# Patient Record
Sex: FEMALE | Race: WHITE | Employment: FULL TIME | URBAN - METROPOLITAN AREA
[De-identification: names, ages, dates, MRNs, and addresses within clinical notes are randomized per-mention and may not be internally consistent; named-entity substitution may affect disease eponyms.]

---

## 2021-03-16 PROBLEM — E66.01 MORBID OBESITY (HCC): Status: ACTIVE | Noted: 2021-03-16

## 2021-03-16 PROBLEM — F32.A DEPRESSION: Status: ACTIVE | Noted: 2021-03-16

## 2021-03-16 PROBLEM — F41.9 ANXIETY: Status: ACTIVE | Noted: 2021-03-16

## 2021-03-16 PROBLEM — I10 HTN (HYPERTENSION): Status: ACTIVE | Noted: 2021-03-16

## 2021-03-16 NOTE — H&P (VIEW-ONLY)
Faizan Couch MD  
Bariatric & Advanced Laparoscopic Surgery & Endoscopy [address], [address] [place] Kim Office [phone] Fax [phone] Date of visit: 3/30/2021 History and Physical 
 
Patient: Elysia Cox MRN: [id number] YOB: 1975  Age: 41 y.o. Sex: female PCP: Gale White MD, in  [facility] Date:  3/30/2021 Elysia Cox  who presents to the [facility] for consideration of bariatric surgery. This is her initial consultation preparing her for our multi-disciplinary surgical preparatory regimen. She presents with a height of 5' 2.5\" (1.588 m) and weight of (!) 459 lb (208.2 kg), giving her a Body mass index is 82.61 kg/m². She has an Ideal body weight of 136 lbs, and excess body weight of 323 lbs. BARIATRIC PROCEDURE OF INTEREST: laparoscopic sleeve surgery 30 day Bariatric Surgical Risk Percentage: 4.50% Sleeve, 9.24% MEDICAL HISTORY: 
Morbid Obesity Depression Anxiety PCOS 
  
Comorbidity Yes or No  
Obstructive Sleep Apnea CPAP/BIPAP use= No  
Diabetes Mellitus Insulin dependent = Last A1c= No  
Hypertension- 
Number of medications=1 Yes Gastroesophageal Reflux Treatment Med= No  
Hyperlipidemia with medication No  
Coronary Artery Disease No  
Cancer No  
Asthma No  
Osteoarthritis No  
Joint Pain No  
  
Patient reports taking Tums after eating certain foods due to acid reflux. Pt reports a burning sensation in her throat being the reason she needs to take medication. She may take a TUMS every 3 months for symptoms.  Pt does report having reflux and on medication during pregnancy.  
  
Other Yes or No  
Venous Stasis No  
Dialysis No  
Long term use of steroids or immunocompromised conditions No  
On Anticoagulants No  
  
PRIOR WEIGHT LOSS ATTEMPTS:  >15 attempts including Weight Watchers, Medication, low fat, Keto 
  
EXERCISE ASSESSMENT:  Walking 2x week, NIH Guidelines reviewed.  
  
DENTAL ISSUES:  No dental issues with chewing  
  
PSYCHOSOCIAL: 
She notes she  is  and states main support people are Her spouse and mother. 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 Her goal in pursuing surgical weight loss is to improve health. She  reports appropriate treatment of depression and anxiety. She states she is independent in her care, can drive a car, and can ambulate without assistance. HISTORY: 
Past Medical History:  
Diagnosis Date  Depression  Hypertension  Morbid obesity ([address] She denies personal or family history of problems with anesthesia, bleeding, or clotting. She denies history of DVT or pulmonary embolism. She denies reflux or dysphagia. Past Surgical History:  
Procedure Laterality Date  HX ADENOIDECTOMY    HX  SECTION  ,   HX CHOLECYSTECTOMY    HX TONSILLECTOMY   Social History Tobacco Use  Smoking status: Former Smoker Quit date:  Years since quittin.2  Smokeless tobacco: Never Used  Tobacco comment: Social smoker Substance Use Topics  Alcohol use: Not Currently  Drug use: Never Family History Problem Relation Age of Onset  No Known Problems Mother  Diabetes Father  Hypertension Father  COPD Father MEDICATIONS: 
Current Outpatient Medications Medication Sig  
 lisinopriL (PRINIVIL, ZESTRIL) 10 mg tablet Take 10 mg by mouth daily.  buPROPion XL (Wellbutrin XL) 150 mg tablet Take 150 mg by mouth every morning.  traZODone (DESYREL) 100 mg tablet Take 100 mg by mouth two (2) times a day. No current facility-administered medications for this visit. Facility-Administered Medications Ordered in Other Visits Medication  barium sulfate (EZ PAQUE) 60 % (w/v) contrast susp 355 mL  barium Sulfate (E-Z-HD) 98 % contrast susp 135 mL ALLERGIES: 
Allergies Allergen Reactions  Tape [Adhesive] Other (comments) Paper tape- Causes Blisters ROS: The patient has no difficulty with chest pain or shortness of breath. No fever or chills. Comprehensive 13 point review of systems was otherwise unremarkable except as noted above. Physical Exam:  
 
Visit Vitals BP (!) 175/115 Pulse 92 Ht 5' 2.5\" (1.588 m) Wt (!) 459 lb (208.2 kg) BMI 82.61 kg/m² General:  Well-developed, well-nourished, no distress. Psych:  Cooperative, good insight and judgement. Neuro:  Alert, oriented to person, place and time. HEENT:  Normocephalic, atraumatic. Sclera clear. Lungs:  Unlabored breathing. Symmetrical chest expansion. Chest wall:  No tenderness or deformity. Heart:  Regular rate and rhythm. No JVD. Abdomen:  Soft, non-tender, non-distended. No guarding or rebound. No palpable masses. Extremities:  Extremities normal, atraumatic, no cyanosis or edema. Skin:  Skin color, texture, turgor normal. No rashes. ASSESSMENT: 
Morbid obesity with a Body mass index is 82.61 kg/m². beginning multi-disciplinary surgical prep program. 
 
PLAN: 
We have discussed the patient's participation and reviewed the steps in our preparatory program. She has had a long history of failed diet and exercise programs and has good understanding about the risks, benefits, and commitment related to bariatric surgery. She has attended our comprehensive educational seminar. She is interested in proceeding with our program seeking the laparoscopic sleeve gastrectomy.  We discussed specific risks of sleeve gastrectomy including but not limited to: pain, infection, bleeding, scar, excess skin, conversion to open approach, hernia, injury to adjacent organs, need for blood transfusion, thromboembolic complications, gastrointestinal leak, stricture, difficulty swallowing, need for revisional surgery, gastroesophageal reflux, esophagitis or esophageal cancer, need for revisional surgery, failure to lose weight or weight regain, nutritional deficiencies, heart attack, stroke, death. ICD-10-CM ICD-9-CM 1. H/O gastroesophageal reflux (GERD)  Z87.19 V12.79 XR UPPER GI SERIES W KUB 2. Encounter for pre-operative laboratory testing  Z01.812 V72.84 HEMOGLOBIN A1C WITH EAG  
   NICOTINE AND METABOLITE, QT SERUM, PLASMA, WHOLE BLOOD  
   TSH 3RD GENERATION  
   VITAMIN D, 25 HYDROXY 3. Morbid obesity ([facility]  E66.01 278.01 REFERRAL TO PHYSICAL THERAPY 4. Hypertension, unspecified type  I10 401.9 REFERRAL TO CARDIOLOGY 5. BMI 70 and over, adult ([facility]  Z68.45 V85.45 REFERRAL TO CARDIOLOGY REFERRAL TO PULMONARY DISEASE 6. Morbid obesity due to excess calories ([facility])  E66.01 278.01   
7. PCOS (polycystic ovarian syndrome)  E28.2 256.4 1. Discussed in detail the Laparoscopic Kit-en-Y Gastric Bypass and the Laparoscopic Sleeve Gastrectomy including the benefits, risks, and complications of each operation. The patient has a clear understanding of all operations. Also, discussed perioperative care and in-hospital and home care after each operation. The patient verbalized and demonstrated a clear understanding of what to expect. 2.  Patient is an excellent candidate with a clear medical necessity for bariatric surgery. 3.  Encouraged patient to participate in our Bariatric Support Group. 4.  Advised that weight loss surgery is only one part of a lifelong weight management program, and that sustainable weight loss will only come with major diet and behavioral changes. Advised that weight loss surgery requires a commitment to self-management and long-term follow up. 5.  Nutrition Recommendations: · Diet Rx  Low-moderate calorie intake (~5997-4675 kcal/day, based on 14-16kcal/kg ABW). Work on eating at least 3x/d with lean protein focus.  2 week pre-operative diet with caloric restriction of ~1200kcal/d.  
 
6.  Exercise Recommendations: Exercise routine, incorporating both cardio and strength training, building up to 5x/wk for at least 30 minutes. Physical therapy evaluation for guidance on exercise routine. 7.  The assessment and plan of care were reviewed in detail with the patient and her questions were answered. She will complete the following steps: 1. Complete bariatric labs after dietitian evaluation. 2.  Participation in our behavior modification program, reduced calorie diet program, and participation in our exercise program recommendations supervised by our office. 3.  Complete our required psychological evaluation. 4.  Reminded of policy of no weight gain during prep period. 5.  Upper GI ordered 6. Physical Therapy Evaluation 7. Cardiac Clearance: >70 BMI, HTN  
8. Pulmonary Clearance: >70 BMI She will return after the above are complete for assessment of her progress and schedule surgery. Time: I spent 60 minutes preparing to see patient (including chart review and preparation), obtaining and/or reviewing additional medical history, performing a physical exam and evaluation, documenting clinical information in the electronic health record, independently interpreting results, communicating results to patient, family or caregiver, and/or coordinating care. Signed: Keerthi Gutierrez MD 
Bariatric & Minimally Invasive Surgery 3/30/2021

## 2021-03-30 ENCOUNTER — HOSPITAL ENCOUNTER (OUTPATIENT)
Dept: GENERAL RADIOLOGY | Age: 46
Discharge: HOME | End: 2021-03-30
Attending: NURSE PRACTITIONER
Payer: COMMERCIAL

## 2021-03-30 DIAGNOSIS — Z87.19 H/O GASTROESOPHAGEAL REFLUX (GERD): ICD-10-CM

## 2021-03-30 PROCEDURE — 74011000250 HC RX REV CODE- 250: Performed by: NURSE PRACTITIONER

## 2021-03-30 PROCEDURE — 74246 X-RAY XM UPR GI TRC 2CNTRST: CPT

## 2021-03-30 RX ADMIN — ANTACID/ANTIFLATULENT 4 G: 380; 550; 10; 10 GRANULE, EFFERVESCENT ORAL at 11:45

## 2021-03-30 RX ADMIN — BARIUM SULFATE 355 ML: 0.6 SUSPENSION ORAL at 11:50

## 2021-03-30 RX ADMIN — BARIUM SULFATE 135 ML: 980 POWDER, FOR SUSPENSION ORAL at 11:47

## 2021-04-19 ENCOUNTER — HOSPITAL ENCOUNTER (OUTPATIENT)
Dept: SURGERY | Age: 46
Discharge: HOME | End: 2021-04-19

## 2021-04-20 VITALS — WEIGHT: 293 LBS | HEIGHT: 63 IN | BODY MASS INDEX: 51.91 KG/M2

## 2021-04-20 NOTE — PERIOP NOTES
Patient verified name, , and procedure. Type: 1a; abbreviated assessment per anesthesia guidelines  Labs per surgeon: No orders received. Labs per anesthesia: None. A negative Covid swab result is required to proceed with surgery; appointments are made by the surgeon office and test should be collected 7 days prior to surgery. The testing center is located at the . [address], [place]. Pt states she is getting COVID swab completed today and will bring results with her on the DOS. Instructed pt that they will be notified by the doctor office for time of arrival to GI lab. If any questions please call the GI lab at [phone]. Follow diet and prep instructions per office. May have clear liquids until 4 hours prior to time of arrival.    [place] or shower the night before and the am of surgery with antibacterial soap. No lotions, oils, powders, cologne on skin. No make up, eye make up or jewelry. Wear loose fitting comfortable, clean clothing. Must have adult present in building the entire time . Medications for the day of procedure Wellbutrin and Trazodone. The following discharge instructions reviewed with patient: medication given during procedure may cause drowsiness for several hours, therefore, do not drive or operate machinery for remainder of the day, no alcohol on the day of your procedure, resume regular diet and activity unless otherwise directed, for mild sore throat you may use Cepacol throat lozenges or warm salt water gargles as needed, call your physician for any problems or questions. Patient verbalizes understanding.

## 2021-04-21 ENCOUNTER — HOSPITAL ENCOUNTER (OUTPATIENT)
Dept: GENERAL RADIOLOGY | Age: 46
Discharge: HOME | End: 2021-04-21
Payer: COMMERCIAL

## 2021-04-21 DIAGNOSIS — Z01.811 PRE-OP CHEST EXAM: ICD-10-CM

## 2021-04-21 PROBLEM — R06.09 DOE (DYSPNEA ON EXERTION): Status: ACTIVE | Noted: 2021-04-21

## 2021-04-21 PROCEDURE — 71046 X-RAY EXAM CHEST 2 VIEWS: CPT

## 2021-04-25 ENCOUNTER — ANESTHESIA EVENT (OUTPATIENT)
Dept: ENDOSCOPY | Age: 46
End: 2021-04-25
Payer: COMMERCIAL

## 2021-04-25 RX ORDER — OXYCODONE HYDROCHLORIDE 5 MG/1
10 TABLET ORAL
Status: CANCELLED | OUTPATIENT
Start: 2021-04-25 | End: 2021-04-26

## 2021-04-25 RX ORDER — SODIUM CHLORIDE, SODIUM LACTATE, POTASSIUM CHLORIDE, CALCIUM CHLORIDE 600; 310; 30; 20 MG/100ML; MG/100ML; MG/100ML; MG/100ML
100 INJECTION, SOLUTION INTRAVENOUS CONTINUOUS
Status: CANCELLED | OUTPATIENT
Start: 2021-04-25

## 2021-04-25 RX ORDER — NALOXONE HYDROCHLORIDE 0.4 MG/ML
0.1 INJECTION, SOLUTION INTRAMUSCULAR; INTRAVENOUS; SUBCUTANEOUS AS NEEDED
Status: CANCELLED | OUTPATIENT
Start: 2021-04-25

## 2021-04-25 RX ORDER — ONDANSETRON 2 MG/ML
4 INJECTION INTRAMUSCULAR; INTRAVENOUS ONCE
Status: CANCELLED | OUTPATIENT
Start: 2021-04-25 | End: 2021-04-25

## 2021-04-25 RX ORDER — ALBUTEROL SULFATE 0.83 MG/ML
2.5 SOLUTION RESPIRATORY (INHALATION) AS NEEDED
Status: CANCELLED | OUTPATIENT
Start: 2021-04-25

## 2021-04-25 RX ORDER — HYDROMORPHONE HYDROCHLORIDE 2 MG/ML
0.5 INJECTION, SOLUTION INTRAMUSCULAR; INTRAVENOUS; SUBCUTANEOUS
Status: CANCELLED | OUTPATIENT
Start: 2021-04-25

## 2021-04-25 RX ORDER — OXYCODONE HYDROCHLORIDE 5 MG/1
5 TABLET ORAL
Status: CANCELLED | OUTPATIENT
Start: 2021-04-25 | End: 2021-04-26

## 2021-04-25 RX ORDER — DIPHENHYDRAMINE HYDROCHLORIDE 50 MG/ML
12.5 INJECTION, SOLUTION INTRAMUSCULAR; INTRAVENOUS
Status: CANCELLED | OUTPATIENT
Start: 2021-04-25 | End: 2021-04-26

## 2021-04-26 ENCOUNTER — ANESTHESIA (OUTPATIENT)
Dept: ENDOSCOPY | Age: 46
End: 2021-04-26
Payer: COMMERCIAL

## 2021-04-26 ENCOUNTER — HOSPITAL ENCOUNTER (OUTPATIENT)
Age: 46
Setting detail: OUTPATIENT SURGERY
Discharge: HOME | End: 2021-04-26
Attending: SURGERY | Admitting: SURGERY
Payer: COMMERCIAL

## 2021-04-26 VITALS
TEMPERATURE: 97.8 F | OXYGEN SATURATION: 97 % | HEIGHT: 62 IN | WEIGHT: 293 LBS | BODY MASS INDEX: 53.92 KG/M2 | DIASTOLIC BLOOD PRESSURE: 65 MMHG | SYSTOLIC BLOOD PRESSURE: 143 MMHG | HEART RATE: 77 BPM | RESPIRATION RATE: 18 BRPM

## 2021-04-26 DIAGNOSIS — K21.9 GASTROESOPHAGEAL REFLUX DISEASE, UNSPECIFIED WHETHER ESOPHAGITIS PRESENT: ICD-10-CM

## 2021-04-26 DIAGNOSIS — E66.01 MORBID OBESITY (HCC): ICD-10-CM

## 2021-04-26 LAB
25(OH)D3 SERPL-MCNC: 28.8 NG/ML (ref 30–100)
EST. AVERAGE GLUCOSE BLD GHB EST-MCNC: 100 MG/DL
HBA1C MFR BLD: 5.1 % (ref 4.2–6.3)
TSH SERPL DL<=0.005 MIU/L-ACNC: 0.56 UIU/ML

## 2021-04-26 PROCEDURE — 76040000025: Performed by: SURGERY

## 2021-04-26 PROCEDURE — 76060000031 HC ANESTHESIA FIRST 0.5 HR: Performed by: SURGERY

## 2021-04-26 PROCEDURE — 77030021593 HC FCPS BIOP ENDOSC BSC -A: Performed by: SURGERY

## 2021-04-26 PROCEDURE — 88312 SPECIAL STAINS GROUP 1: CPT

## 2021-04-26 PROCEDURE — 2709999900 HC NON-CHARGEABLE SUPPLY: Performed by: SURGERY

## 2021-04-26 PROCEDURE — 87081 CULTURE SCREEN ONLY: CPT

## 2021-04-26 PROCEDURE — 88305 TISSUE EXAM BY PATHOLOGIST: CPT

## 2021-04-26 PROCEDURE — 83036 HEMOGLOBIN GLYCOSYLATED A1C: CPT

## 2021-04-26 PROCEDURE — 80323 ALKALOIDS NOS: CPT

## 2021-04-26 PROCEDURE — 82306 VITAMIN D 25 HYDROXY: CPT

## 2021-04-26 PROCEDURE — 74011250636 HC RX REV CODE- 250/636: Performed by: ANESTHESIOLOGY

## 2021-04-26 PROCEDURE — 84443 ASSAY THYROID STIM HORMONE: CPT

## 2021-04-26 PROCEDURE — 74011000250 HC RX REV CODE- 250: Performed by: NURSE ANESTHETIST, CERTIFIED REGISTERED

## 2021-04-26 PROCEDURE — 43239 EGD BIOPSY SINGLE/MULTIPLE: CPT | Performed by: SURGERY

## 2021-04-26 PROCEDURE — 74011250636 HC RX REV CODE- 250/636: Performed by: NURSE ANESTHETIST, CERTIFIED REGISTERED

## 2021-04-26 RX ORDER — LIDOCAINE HYDROCHLORIDE 10 MG/ML
0.1 INJECTION INFILTRATION; PERINEURAL AS NEEDED
Status: DISCONTINUED | OUTPATIENT
Start: 2021-04-26 | End: 2021-04-26 | Stop reason: HOSPADM

## 2021-04-26 RX ORDER — MIDAZOLAM HYDROCHLORIDE 1 MG/ML
2 INJECTION, SOLUTION INTRAMUSCULAR; INTRAVENOUS
Status: DISCONTINUED | OUTPATIENT
Start: 2021-04-26 | End: 2021-04-26 | Stop reason: HOSPADM

## 2021-04-26 RX ORDER — FENTANYL CITRATE 50 UG/ML
100 INJECTION, SOLUTION INTRAMUSCULAR; INTRAVENOUS ONCE
Status: DISCONTINUED | OUTPATIENT
Start: 2021-04-26 | End: 2021-04-26 | Stop reason: HOSPADM

## 2021-04-26 RX ORDER — PANTOPRAZOLE SODIUM 40 MG/1
40 TABLET, DELAYED RELEASE ORAL DAILY
Qty: 42 TAB | Refills: 0 | Status: SHIPPED | OUTPATIENT
Start: 2021-04-26 | End: 2021-05-23

## 2021-04-26 RX ORDER — SODIUM CHLORIDE, SODIUM LACTATE, POTASSIUM CHLORIDE, CALCIUM CHLORIDE 600; 310; 30; 20 MG/100ML; MG/100ML; MG/100ML; MG/100ML
100 INJECTION, SOLUTION INTRAVENOUS CONTINUOUS
Status: DISCONTINUED | OUTPATIENT
Start: 2021-04-26 | End: 2021-04-26 | Stop reason: HOSPADM

## 2021-04-26 RX ORDER — MIDAZOLAM HYDROCHLORIDE 1 MG/ML
2 INJECTION, SOLUTION INTRAMUSCULAR; INTRAVENOUS ONCE
Status: DISCONTINUED | OUTPATIENT
Start: 2021-04-26 | End: 2021-04-26 | Stop reason: HOSPADM

## 2021-04-26 RX ORDER — LIDOCAINE HYDROCHLORIDE 20 MG/ML
INJECTION, SOLUTION EPIDURAL; INFILTRATION; INTRACAUDAL; PERINEURAL AS NEEDED
Status: DISCONTINUED | OUTPATIENT
Start: 2021-04-26 | End: 2021-04-26 | Stop reason: HOSPADM

## 2021-04-26 RX ORDER — PROPOFOL 10 MG/ML
INJECTION, EMULSION INTRAVENOUS AS NEEDED
Status: DISCONTINUED | OUTPATIENT
Start: 2021-04-26 | End: 2021-04-26 | Stop reason: HOSPADM

## 2021-04-26 RX ADMIN — PROPOFOL 20 MG: 10 INJECTION, EMULSION INTRAVENOUS at 10:42

## 2021-04-26 RX ADMIN — LIDOCAINE HYDROCHLORIDE 100 MG: 20 INJECTION, SOLUTION EPIDURAL; INFILTRATION; INTRACAUDAL; PERINEURAL at 10:40

## 2021-04-26 RX ADMIN — PROPOFOL 50 MG: 10 INJECTION, EMULSION INTRAVENOUS at 10:41

## 2021-04-26 RX ADMIN — PROPOFOL 50 MG: 10 INJECTION, EMULSION INTRAVENOUS at 10:40

## 2021-04-26 RX ADMIN — PROPOFOL 30 MG: 10 INJECTION, EMULSION INTRAVENOUS at 10:44

## 2021-04-26 RX ADMIN — PROPOFOL 30 MG: 10 INJECTION, EMULSION INTRAVENOUS at 10:46

## 2021-04-26 RX ADMIN — PROPOFOL 30 MG: 10 INJECTION, EMULSION INTRAVENOUS at 10:47

## 2021-04-26 RX ADMIN — SODIUM CHLORIDE, POTASSIUM CHLORIDE, SODIUM LACTATE AND CALCIUM CHLORIDE 100 ML/HR: 600; 310; 30; 20 INJECTION, SOLUTION INTRAVENOUS at 10:11

## 2021-04-26 NOTE — DISCHARGE INSTRUCTIONS
Patient Education        Upper GI Endoscopy: What to Expect at [address] had an upper GI endoscopy. Your doctor used a thin, lighted tube that bends to look at the inside of your esophagus, your stomach, and the first part of the small intestine, called the duodenum. After you have an endoscopy, you will stay at the hospital or clinic for 1 to 2 hours. This will allow the medicine to wear off. You will be able to go home after your doctor or nurse checks to make sure that you're not having any problems. You may have to stay overnight if you had treatment during the test. You may have a sore throat for a day or two after the test.  This care sheet gives you a general idea about what to expect after the test.  How can you care for yourself at home? Activity   · Rest as much as you need to after you go home. · You should be able to go back to your usual activities the day after the test.  Diet   · Follow your doctor's directions for eating after the test.  · Drink plenty of fluids (unless your doctor has told you not to). Medications   · If you have a sore throat the day after the test, use an over-the-counter spray to numb your throat. Follow-up care is a key part of your treatment and safety. Be sure to make and go to all appointments, and call your doctor if you are having problems. It's also a good idea to know your test results and keep a list of the medicines you take. When should you call for help? Call 911 anytime you think you may need emergency care. For example, call if:    · You passed out (lost consciousness).     · You have trouble breathing.     · You pass maroon or bloody stools.    Call your doctor now or seek immediate medical care if:    · You have pain that does not get better after your take pain medicine.     · You have new or worse belly pain.     · You have blood in your stools.     · You are sick to your stomach and cannot keep fluids down.     · You have a fever.     · You cannot pass stools or gas. Watch closely for changes in your health, and be sure to contact your doctor if:    · Your throat still hurts after a day or two.     · You do not get better as expected. Where can you learn more? Go to http://www.wesley.com/  Enter J454 in the search box to learn more about \"Upper GI Endoscopy: What to Expect at Home. \"  Current as of: April 15, 2020               Content Version: 12.8  © 1946-6800 [employer]. Care instructions adapted under license by [employer] (which disclaims liability or warranty for this information). If you have questions about a medical condition or this instruction, always ask your healthcare professional. [address] any warranty or liability for your use of this information. After general anesthesia or intravenous sedation, for 24 hours or while taking prescription Narcotics:  · Limit your activities  · A responsible adult needs to be with you for the next 24 hours  · Do not drive and operate hazardous machinery  · Do not make important personal or business decisions  · Do not drink alcoholic beverages  · If you have not urinated within 8 hours after discharge, please contact your surgeon on call. · If you have sleep apnea and have a CPAP machine, please use it for all naps and sleeping. · Please use caution when taking narcotics and any of your home medications that may cause drowsiness. *  Please give a list of your current medications to your Primary Care Provider. *  Please update this list whenever your medications are discontinued, doses are      changed, or new medications (including over-the-counter products) are added. *  Please carry medication information at all times in case of emergency situations.     These are general instructions for a healthy lifestyle:  No smoking/ No tobacco products/ Avoid exposure to second hand smoke  Surgeon General's Warning: Quitting smoking now greatly reduces serious risk to your health. Obesity, smoking, and sedentary lifestyle greatly increases your risk for illness  A healthy diet, regular physical exercise & weight monitoring are important for maintaining a healthy lifestyle    You may be retaining fluid if you have a history of heart failure or if you experience any of the following symptoms:  Weight gain of 3 pounds or more overnight or 5 pounds in a week, increased swelling in our hands or feet or shortness of breath while lying flat in bed. Please call your doctor as soon as you notice any of these symptoms; do not wait until your next office visit.

## 2021-04-26 NOTE — INTERVAL H&P NOTE
Update History & Physical 
 
The Patient's History and Physical of March 30,  
EGD was reviewed with the patient and I examined the patient. There was no change. The surgical site was confirmed by the patient and me. Plan:  The risk, benefits, expected outcome, and alternative to the recommended procedure have been discussed with the patient. Patient understands and wants to proceed with the procedure.  
 
Electronically signed by Ana Botello MD on 4/26/2021 at 10:29 AM

## 2021-04-26 NOTE — ANESTHESIA PREPROCEDURE EVALUATION
Anesthetic History   No history of anesthetic complications            Review of Systems / Medical History  Patient summary reviewed and pertinent labs reviewed    Pulmonary  Within defined limits                 Neuro/Psych         Psychiatric history (Depression)     Cardiovascular    Hypertension              Exercise tolerance: >4 METS     GI/Hepatic/Renal  Within defined limits              Endo/Other        Morbid obesity (Supramorbid Obesity. BMI 84. 459lbs.)     Other Findings              Physical Exam    Airway  Mallampati: II  TM Distance: 4 - 6 cm  Neck ROM: normal range of motion, short neck   Mouth opening: Normal     Cardiovascular  Regular rate and rhythm,  S1 and S2 normal,  no murmur, click, rub, or gallop            Comments: Distant heart sounds Dental  No notable dental hx       Pulmonary  Breath sounds clear to auscultation              Comments: Distant breath sounds. Abdominal  GI exam deferred       Other Findings            Anesthetic Plan    ASA: 3  Anesthesia type: total IV anesthesia          Induction: Intravenous  Anesthetic plan and risks discussed with: Patient      Airway exam reassuring. Will plan to attempt under TIVA with Glidescope present in endoscopy suite.

## 2021-04-26 NOTE — ANESTHESIA POSTPROCEDURE EVALUATION
Procedure(s):  ESOPHAGOGASTRODUODENOSCOPY (EGD)/ BMI 83  ESOPHAGOGASTRODUODENAL (EGD) BIOPSY. total IV anesthesia    Anesthesia Post Evaluation      Multimodal analgesia: multimodal analgesia used between 6 hours prior to anesthesia start to PACU discharge  Patient location during evaluation: PACU  Patient participation: complete - patient participated  Level of consciousness: awake  Pain management: adequate  Airway patency: patent  Anesthetic complications: no  Cardiovascular status: acceptable  Respiratory status: spontaneous ventilation and acceptable  Hydration status: acceptable  Comments: Patient tearful in PACU, but not in pain. Says she's just emotional and was very nervous about the procedure. Of note, she did have gastric contents in the stomach (despite her adequate NPO duration per guidelines) when Dr. Erendira Singh did her EGD. It was quickly suctioned and she had no evidence of or concern for aspiration.   Post anesthesia nausea and vomiting:  none      INITIAL Post-op Vital signs:   Vitals Value Taken Time   /64 04/26/21 1100   Temp 36.6 °C (97.8 °F) 04/26/21 1055   Pulse 82 04/26/21 1100   Resp 16 04/26/21 1100   SpO2 97 % 04/26/21 1100

## 2021-04-26 NOTE — OP NOTES
Esophagogastroduodenoscopy Procedure Note    Date of procedure: 2021      Name: Cammy Hylton      MRN: [id number]       : 1975       Age: 41 y.o. Sex: female    Preoperative Diagnosis: 1. GERD      2. Morbid obesit    Postoperative Diagnosis: 1. same      2. Mild gastritis      3. Erosions      4. Tiny hiatal hernia    Procedure(s):  ESOPHAGOGASTRODUODENOSCOPY (EGD)/ BMI 83  ESOPHAGOGASTRODUODENAL (EGD) BIOPSY 06056    Procedure in Detail:  Informed consent was obtained for the procedure, the patient was brought to the endoscopy suite and sedation was induced by anesthesia. The [id number] gastroscope was inserted into the mouth and advanced under direct vision to second portion of the duodenum. A careful inspection was made as the gastroscope was withdrawn, including a retroflexed view of the proximal stomach; findings and interventions are described below, with appropriate photodocumentation obtained. Findings:   Oropharynx:   Normal  Esophagus:       - tiny hiatal hernia  GEJ 36 cm  Cardia of the stomach:    Normal  Body of the stomach:      - Excavated lesions:     - Erosions    - Flat lesions:     - mild gastritis  Fundus of the stomach:    Normal  Antrum of the stomach:      - Excavated lesions:     - Erosions    - Flat lesions:     - mild gastritis  Duodenum:    Normal      Therapies:    biopsy of stomach body, antrum, pre pyloric antrum    EBL-  minimal    Specimens: Specimens were collected and sent to pathology. ID Type Source Tests Collected by Time Destination   1 : gastric bxs Preservative   Darion Velazquez MD 2021 1045 Pathology   1 : gissel test Tissue Gastric  Darion Velazquez MD 2021 1045 Microbiology               Complications:   None; patient tolerated the procedure well. Recommendations:  - Acid suppression with a proton pump inhibitor.  - Await pathology.   - Await GISSEL test result and treat for Helicobacter pylori if positive.     Signed by: Jerri Hylton MD  [address] [facility] - Bariatric & Minimally Invasive Surgery  4/26/2021 10:54 AM

## 2021-04-27 LAB
H PYLORI AG TISS QL IMSTN: NEGATIVE
H PYLORI AG TISS QL IMSTN: NEGATIVE

## 2021-05-03 LAB
COTININE SERPL-MCNC: <1 NG/ML
NICOTINE SERPL-MCNC: <1 NG/ML

## 2021-07-13 ENCOUNTER — HOSPITAL ENCOUNTER (OUTPATIENT)
Dept: SURGERY | Age: 46
Discharge: HOME | End: 2021-07-13
Attending: SURGERY
Payer: COMMERCIAL

## 2021-07-13 VITALS
HEART RATE: 95 BPM | OXYGEN SATURATION: 94 % | BODY MASS INDEX: 53.92 KG/M2 | SYSTOLIC BLOOD PRESSURE: 118 MMHG | TEMPERATURE: 97.3 F | HEIGHT: 62 IN | RESPIRATION RATE: 18 BRPM | WEIGHT: 293 LBS | DIASTOLIC BLOOD PRESSURE: 90 MMHG

## 2021-07-13 LAB — HGB BLD-MCNC: 14.4 G/DL (ref 11.7–15.4)

## 2021-07-13 PROCEDURE — 85018 HEMOGLOBIN: CPT

## 2021-07-13 PROCEDURE — 36415 COLL VENOUS BLD VENIPUNCTURE: CPT

## 2021-07-13 NOTE — PERIOP NOTES
Patient verified name and     Order for consent not found in EHR. Type 2 surgery, PAT walk in assessment complete. Labs per surgeon: None found in EHR   Labs per anesthesia protocol: Hemoglobin; results pending    EKG: Not required per anesthesia guidelines but latest EKG 2021 found in Chart review for anesthesia reference. Last ECHO report 2021 also found in Chart Review for anesthesia reference with LVEF >50%  Last office note from Cardiology  and found in Chart Review; following phone conversation found in Chart review:  Lali Correa LPN at 53/31/44 6722    Status: Signed      Called and informed pt per Dr. Tash Rhodes is low cardiovascular risk for gastric sleeve. Pt voiced understanding. Asked pt to have surgeon call for cardiac clearance. Pt agreed to do so./wc            Patient has completed [employer] vaccine series and vaccination record card scanned into [employer] [place] approved surgical skin cleanser and instructions given per hospital policy. Patient provided with and instructed on educational handouts including Guide to Surgery, Pain Management, Hand Hygiene, Blood Transfusion Education, and [place] [facility] Brochure. Patient answered medical/surgical history questions at their best of ability. All prior to admission medications documented in [facility]. Original medication prescription bottle ws visualized during patient appointment. Patient instructed to hold all vitamins 7 days prior to surgery and NSAIDS 5 days prior to surgery, patient verbalized understanding. Patient teach back successful and patient demonstrates knowledge of instructions.

## 2021-07-13 NOTE — PERIOP NOTES
SURGICAL WEIGHT LOSS Enhanced Recovery After Surgery: diabetic and non-diabetic patients      The night before surgery 07/20/2021, drink 1 bottles of the Ensure Pre-Surgery drink. The morning of surgery 07/21/2021, drink 1/2 bottle of the Ensure Pre-Surgery drink while on your way to the hospital. Drink this over 5-10 minutes. Drink nothing else after drinking the pre-surgical drink the morning of surgery. Bring your patient handbook with you to the hospital.        Things to Remember:      1. You will be up in a chair the evening of surgery and sipping on clear liquids, once released by your surgeon. 2. Beginning the day of surgery, you will be out of the bed as able, once released by your hospital team. We encourage you to be sitting up in a chair, walking in the rosas, doing exercises as advised by physical therapy, etc.       3. You will be given scheduled non-narcotic pain medication to help keep your pain under control. You will have stronger pain medication ordered for break through pain. 4. All of these measures are geared toward improving your overall surgical recovery and   decreasing the risk of complications.

## 2021-07-13 NOTE — PERIOP NOTES
PLEASE CONTINUE TAKING ALL PRESCRIPTION MEDICATIONS UP TO THE DAY OF SURGERY UNLESS OTHERWISE DIRECTED BELOW. DISCONTINUE all vitamins and supplements 7 days prior to surgery. DISCONTINUE Non-Steriodal Anti-Inflammatory (NSAIDS) such as Advil and Aleve 5 days prior to surgery. Home Medications to take  the day of surgery   Bupropion (Wellbutrin)  Pantoprazole             Home Medications   to Hold           Comments      On the day before surgery please take Acetaminophen 1000mg in the morning and then again before bed. You may substitute for Tylenol 650 mg. Please do not bring home medications with you on the day of surgery unless otherwise directed by your nurse. If you are instructed to bring home medications, please give them to your nurse as they will be administered by the nursing staff. If you have any questions, please call [facility] [phone] or [address] [place] [address] [phone]. A copy of this note was provided to the patient for reference.

## 2021-07-14 NOTE — PERIOP NOTES
HGB done 7/13/21 WNL    Recent Results (from the past 24 hour(s))   HEMOGLOBIN    Collection Time: 07/13/21  4:00 PM   Result Value Ref Range    HGB 14.4 11.7 - 15.4 g/dL

## 2021-07-20 ENCOUNTER — ANESTHESIA EVENT (OUTPATIENT)
Dept: SURGERY | Age: 46
DRG: 621 | End: 2021-07-20
Payer: COMMERCIAL

## 2021-07-21 ENCOUNTER — ANESTHESIA (OUTPATIENT)
Dept: SURGERY | Age: 46
DRG: 621 | End: 2021-07-21
Payer: COMMERCIAL

## 2021-07-21 ENCOUNTER — HOSPITAL ENCOUNTER (INPATIENT)
Age: 46
LOS: 1 days | Discharge: HOME | DRG: 621 | End: 2021-07-22
Attending: SURGERY | Admitting: SURGERY
Payer: COMMERCIAL

## 2021-07-21 DIAGNOSIS — F41.9 ANXIETY: ICD-10-CM

## 2021-07-21 DIAGNOSIS — R06.09 DOE (DYSPNEA ON EXERTION): ICD-10-CM

## 2021-07-21 DIAGNOSIS — E66.01 MORBID OBESITY (HCC): ICD-10-CM

## 2021-07-21 DIAGNOSIS — I10 ESSENTIAL HYPERTENSION: ICD-10-CM

## 2021-07-21 PROCEDURE — 65270000029 HC RM PRIVATE

## 2021-07-21 PROCEDURE — 77030040922 HC BLNKT HYPOTHRM STRY -A: Performed by: ANESTHESIOLOGY

## 2021-07-21 PROCEDURE — 77030010507 HC ADH SKN DERMBND J&J -B: Performed by: SURGERY

## 2021-07-21 PROCEDURE — 74011250636 HC RX REV CODE- 250/636: Performed by: SURGERY

## 2021-07-21 PROCEDURE — 77030035279 HC SEAL VSL ENDOWR XI INTU -I2: Performed by: SURGERY

## 2021-07-21 PROCEDURE — 77030037088 HC TUBE ENDOTRACH ORAL NSL COVD-A: Performed by: ANESTHESIOLOGY

## 2021-07-21 PROCEDURE — 76210000016 HC OR PH I REC 1 TO 1.5 HR: Performed by: SURGERY

## 2021-07-21 PROCEDURE — 74011000250 HC RX REV CODE- 250: Performed by: ANESTHESIOLOGY

## 2021-07-21 PROCEDURE — S2900 ROBOTIC SURGICAL SYSTEM: HCPCS | Performed by: SURGERY

## 2021-07-21 PROCEDURE — 77030008756 HC TU IRR SUC STRY -B: Performed by: SURGERY

## 2021-07-21 PROCEDURE — 8E0W4CZ ROBOTIC ASSISTED PROCEDURE OF TRUNK REGION, PERCUTANEOUS ENDOSCOPIC APPROACH: ICD-10-PCS | Performed by: SURGERY

## 2021-07-21 PROCEDURE — 77030021678 HC GLIDESCP STAT DISP VERT -B: Performed by: ANESTHESIOLOGY

## 2021-07-21 PROCEDURE — 76010000875 HC OR TIME 1.5 TO 2HR INTENSV - TIER 2: Performed by: SURGERY

## 2021-07-21 PROCEDURE — 77030035277 HC OBTRTR BLDELSS DISP INTU -B: Performed by: SURGERY

## 2021-07-21 PROCEDURE — 76060000034 HC ANESTHESIA 1.5 TO 2 HR: Performed by: SURGERY

## 2021-07-21 PROCEDURE — 74011250636 HC RX REV CODE- 250/636: Performed by: NURSE ANESTHETIST, CERTIFIED REGISTERED

## 2021-07-21 PROCEDURE — 77030033188 HC TBNG FLTRD BIIFUR DISP CNMD -C: Performed by: SURGERY

## 2021-07-21 PROCEDURE — 77030040259 HC STPLR DEV SUREFORM DVNCI INTU -F: Performed by: SURGERY

## 2021-07-21 PROCEDURE — 77030003578 HC NDL INSUF VERES AMR -B: Performed by: SURGERY

## 2021-07-21 PROCEDURE — 77030035489 HC REDUCR CANN ENDOWR INTU -C: Performed by: SURGERY

## 2021-07-21 PROCEDURE — 77030035278 HC STPLR SEAL ENDOWR INTU -B: Performed by: SURGERY

## 2021-07-21 PROCEDURE — 77010033678 HC OXYGEN DAILY

## 2021-07-21 PROCEDURE — 77030003029 HC SUT VCRL J&J -B: Performed by: SURGERY

## 2021-07-21 PROCEDURE — 74011000250 HC RX REV CODE- 250: Performed by: SURGERY

## 2021-07-21 PROCEDURE — 2709999900 HC NON-CHARGEABLE SUPPLY: Performed by: SURGERY

## 2021-07-21 PROCEDURE — 77030031139 HC SUT VCRL2 J&J -A: Performed by: SURGERY

## 2021-07-21 PROCEDURE — 88305 TISSUE EXAM BY PATHOLOGIST: CPT

## 2021-07-21 PROCEDURE — 74011250637 HC RX REV CODE- 250/637: Performed by: SURGERY

## 2021-07-21 PROCEDURE — 77030040260 HC STPLR RELD SUREFORM DVNCI INTU -C: Performed by: SURGERY

## 2021-07-21 PROCEDURE — 74011250636 HC RX REV CODE- 250/636: Performed by: ANESTHESIOLOGY

## 2021-07-21 PROCEDURE — 88312 SPECIAL STAINS GROUP 1: CPT

## 2021-07-21 PROCEDURE — 0DB64Z3 EXCISION OF STOMACH, PERCUTANEOUS ENDOSCOPIC APPROACH, VERTICAL: ICD-10-PCS | Performed by: SURGERY

## 2021-07-21 PROCEDURE — 43775 LAP SLEEVE GASTRECTOMY: CPT | Performed by: SURGERY

## 2021-07-21 PROCEDURE — 77030016151 HC PROTCTR LNS DFOG COVD -B: Performed by: SURGERY

## 2021-07-21 PROCEDURE — 77030040361 HC SLV COMPR DVT MDII -B: Performed by: SURGERY

## 2021-07-21 PROCEDURE — 74011000250 HC RX REV CODE- 250: Performed by: NURSE ANESTHETIST, CERTIFIED REGISTERED

## 2021-07-21 PROCEDURE — 74011250637 HC RX REV CODE- 250/637: Performed by: ANESTHESIOLOGY

## 2021-07-21 RX ORDER — NALOXONE HYDROCHLORIDE 0.4 MG/ML
0.4 INJECTION, SOLUTION INTRAMUSCULAR; INTRAVENOUS; SUBCUTANEOUS AS NEEDED
Status: DISCONTINUED | OUTPATIENT
Start: 2021-07-21 | End: 2021-07-22 | Stop reason: HOSPADM

## 2021-07-21 RX ORDER — SCOLOPAMINE TRANSDERMAL SYSTEM 1 MG/1
1 PATCH, EXTENDED RELEASE TRANSDERMAL ONCE
Status: DISCONTINUED | OUTPATIENT
Start: 2021-07-21 | End: 2021-07-22 | Stop reason: HOSPADM

## 2021-07-21 RX ORDER — LIDOCAINE HYDROCHLORIDE 20 MG/ML
INJECTION, SOLUTION EPIDURAL; INFILTRATION; INTRACAUDAL; PERINEURAL AS NEEDED
Status: DISCONTINUED | OUTPATIENT
Start: 2021-07-21 | End: 2021-07-21 | Stop reason: HOSPADM

## 2021-07-21 RX ORDER — GABAPENTIN 100 MG/1
200 CAPSULE ORAL 2 TIMES DAILY
Status: DISCONTINUED | OUTPATIENT
Start: 2021-07-21 | End: 2021-07-21 | Stop reason: SDUPTHER

## 2021-07-21 RX ORDER — PANTOPRAZOLE SODIUM 40 MG/10ML
40 INJECTION, POWDER, LYOPHILIZED, FOR SOLUTION INTRAVENOUS DAILY
Status: DISCONTINUED | OUTPATIENT
Start: 2021-07-22 | End: 2021-07-21 | Stop reason: SDUPTHER

## 2021-07-21 RX ORDER — ONDANSETRON 2 MG/ML
INJECTION INTRAMUSCULAR; INTRAVENOUS AS NEEDED
Status: DISCONTINUED | OUTPATIENT
Start: 2021-07-21 | End: 2021-07-21 | Stop reason: HOSPADM

## 2021-07-21 RX ORDER — SODIUM CHLORIDE, SODIUM LACTATE, POTASSIUM CHLORIDE, CALCIUM CHLORIDE 600; 310; 30; 20 MG/100ML; MG/100ML; MG/100ML; MG/100ML
25 INJECTION, SOLUTION INTRAVENOUS CONTINUOUS
Status: DISCONTINUED | OUTPATIENT
Start: 2021-07-21 | End: 2021-07-21 | Stop reason: HOSPADM

## 2021-07-21 RX ORDER — SUCCINYLCHOLINE CHLORIDE 20 MG/ML
INJECTION INTRAMUSCULAR; INTRAVENOUS AS NEEDED
Status: DISCONTINUED | OUTPATIENT
Start: 2021-07-21 | End: 2021-07-21 | Stop reason: HOSPADM

## 2021-07-21 RX ORDER — NALOXONE HYDROCHLORIDE 0.4 MG/ML
0.2 INJECTION, SOLUTION INTRAMUSCULAR; INTRAVENOUS; SUBCUTANEOUS AS NEEDED
Status: DISCONTINUED | OUTPATIENT
Start: 2021-07-21 | End: 2021-07-21 | Stop reason: HOSPADM

## 2021-07-21 RX ORDER — APREPITANT 40 MG/1
40 CAPSULE ORAL ONCE
Status: COMPLETED | OUTPATIENT
Start: 2021-07-21 | End: 2021-07-21

## 2021-07-21 RX ORDER — FENTANYL CITRATE 50 UG/ML
100 INJECTION, SOLUTION INTRAMUSCULAR; INTRAVENOUS ONCE
Status: DISCONTINUED | OUTPATIENT
Start: 2021-07-21 | End: 2021-07-21 | Stop reason: HOSPADM

## 2021-07-21 RX ORDER — MIDAZOLAM HYDROCHLORIDE 1 MG/ML
2 INJECTION, SOLUTION INTRAMUSCULAR; INTRAVENOUS ONCE
Status: COMPLETED | OUTPATIENT
Start: 2021-07-21 | End: 2021-07-21

## 2021-07-21 RX ORDER — OXYCODONE HYDROCHLORIDE 5 MG/1
5 TABLET ORAL
Status: DISCONTINUED | OUTPATIENT
Start: 2021-07-21 | End: 2021-07-22 | Stop reason: HOSPADM

## 2021-07-21 RX ORDER — GABAPENTIN 100 MG/1
200 CAPSULE ORAL 3 TIMES DAILY
Status: DISCONTINUED | OUTPATIENT
Start: 2021-07-21 | End: 2021-07-22 | Stop reason: HOSPADM

## 2021-07-21 RX ORDER — NALOXONE HYDROCHLORIDE 0.4 MG/ML
0.2 INJECTION, SOLUTION INTRAMUSCULAR; INTRAVENOUS; SUBCUTANEOUS
Status: DISCONTINUED | OUTPATIENT
Start: 2021-07-21 | End: 2021-07-21 | Stop reason: HOSPADM

## 2021-07-21 RX ORDER — MIDAZOLAM HYDROCHLORIDE 1 MG/ML
2 INJECTION, SOLUTION INTRAMUSCULAR; INTRAVENOUS
Status: DISCONTINUED | OUTPATIENT
Start: 2021-07-21 | End: 2021-07-21 | Stop reason: HOSPADM

## 2021-07-21 RX ORDER — ONDANSETRON 2 MG/ML
4 INJECTION INTRAMUSCULAR; INTRAVENOUS EVERY 4 HOURS
Status: DISCONTINUED | OUTPATIENT
Start: 2021-07-21 | End: 2021-07-22 | Stop reason: HOSPADM

## 2021-07-21 RX ORDER — HEPARIN SODIUM 5000 [USP'U]/ML
5000 INJECTION, SOLUTION INTRAVENOUS; SUBCUTANEOUS ONCE
Status: COMPLETED | OUTPATIENT
Start: 2021-07-21 | End: 2021-07-21

## 2021-07-21 RX ORDER — LIDOCAINE HYDROCHLORIDE ANHYDROUS AND DEXTROSE MONOHYDRATE .8; 5 G/100ML; G/100ML
1 INJECTION, SOLUTION INTRAVENOUS CONTINUOUS
Status: ACTIVE | OUTPATIENT
Start: 2021-07-21 | End: 2021-07-22

## 2021-07-21 RX ORDER — BUPIVACAINE HYDROCHLORIDE 2.5 MG/ML
INJECTION, SOLUTION EPIDURAL; INFILTRATION; INTRACAUDAL AS NEEDED
Status: DISCONTINUED | OUTPATIENT
Start: 2021-07-21 | End: 2021-07-21 | Stop reason: HOSPADM

## 2021-07-21 RX ORDER — KETAMINE HYDROCHLORIDE 50 MG/ML
INJECTION, SOLUTION INTRAMUSCULAR; INTRAVENOUS AS NEEDED
Status: DISCONTINUED | OUTPATIENT
Start: 2021-07-21 | End: 2021-07-21 | Stop reason: HOSPADM

## 2021-07-21 RX ORDER — ACETAMINOPHEN 500 MG
1000 TABLET ORAL EVERY 6 HOURS
Status: DISCONTINUED | OUTPATIENT
Start: 2021-07-21 | End: 2021-07-22 | Stop reason: HOSPADM

## 2021-07-21 RX ORDER — FENTANYL CITRATE 50 UG/ML
INJECTION, SOLUTION INTRAMUSCULAR; INTRAVENOUS AS NEEDED
Status: DISCONTINUED | OUTPATIENT
Start: 2021-07-21 | End: 2021-07-21 | Stop reason: HOSPADM

## 2021-07-21 RX ORDER — LIDOCAINE HYDROCHLORIDE 10 MG/ML
0.1 INJECTION INFILTRATION; PERINEURAL AS NEEDED
Status: DISCONTINUED | OUTPATIENT
Start: 2021-07-21 | End: 2021-07-21 | Stop reason: HOSPADM

## 2021-07-21 RX ORDER — METOCLOPRAMIDE 10 MG/1
5 TABLET ORAL ONCE
Status: COMPLETED | OUTPATIENT
Start: 2021-07-21 | End: 2021-07-21

## 2021-07-21 RX ORDER — OXYCODONE HYDROCHLORIDE 5 MG/1
5 TABLET ORAL
Status: DISCONTINUED | OUTPATIENT
Start: 2021-07-21 | End: 2021-07-21 | Stop reason: HOSPADM

## 2021-07-21 RX ORDER — LIDOCAINE HYDROCHLORIDE ANHYDROUS AND DEXTROSE MONOHYDRATE .8; 5 G/100ML; G/100ML
INJECTION, SOLUTION INTRAVENOUS
Status: DISCONTINUED | OUTPATIENT
Start: 2021-07-21 | End: 2021-07-21 | Stop reason: HOSPADM

## 2021-07-21 RX ORDER — ACETAMINOPHEN 500 MG
1000 TABLET ORAL ONCE
Status: COMPLETED | OUTPATIENT
Start: 2021-07-21 | End: 2021-07-21

## 2021-07-21 RX ORDER — HYDROMORPHONE HYDROCHLORIDE 2 MG/ML
0.5 INJECTION, SOLUTION INTRAMUSCULAR; INTRAVENOUS; SUBCUTANEOUS
Status: COMPLETED | OUTPATIENT
Start: 2021-07-21 | End: 2021-07-21

## 2021-07-21 RX ORDER — SUCRALFATE 1 G/1
1 TABLET ORAL 4 TIMES DAILY
Status: DISCONTINUED | OUTPATIENT
Start: 2021-07-21 | End: 2021-07-22 | Stop reason: HOSPADM

## 2021-07-21 RX ORDER — SODIUM CHLORIDE, SODIUM LACTATE, POTASSIUM CHLORIDE, CALCIUM CHLORIDE 600; 310; 30; 20 MG/100ML; MG/100ML; MG/100ML; MG/100ML
75 INJECTION, SOLUTION INTRAVENOUS CONTINUOUS
Status: DISPENSED | OUTPATIENT
Start: 2021-07-21 | End: 2021-07-22

## 2021-07-21 RX ORDER — EPHEDRINE SULFATE/0.9% NACL/PF 50 MG/5 ML
SYRINGE (ML) INTRAVENOUS AS NEEDED
Status: DISCONTINUED | OUTPATIENT
Start: 2021-07-21 | End: 2021-07-21 | Stop reason: HOSPADM

## 2021-07-21 RX ORDER — HYDROMORPHONE HYDROCHLORIDE 1 MG/ML
0.5 INJECTION, SOLUTION INTRAMUSCULAR; INTRAVENOUS; SUBCUTANEOUS
Status: DISCONTINUED | OUTPATIENT
Start: 2021-07-21 | End: 2021-07-22 | Stop reason: HOSPADM

## 2021-07-21 RX ORDER — HEPARIN SODIUM 5000 [USP'U]/ML
5000 INJECTION, SOLUTION INTRAVENOUS; SUBCUTANEOUS EVERY 8 HOURS
Status: DISCONTINUED | OUTPATIENT
Start: 2021-07-21 | End: 2021-07-22 | Stop reason: HOSPADM

## 2021-07-21 RX ORDER — SCOLOPAMINE TRANSDERMAL SYSTEM 1 MG/1
1 PATCH, EXTENDED RELEASE TRANSDERMAL ONCE
Status: DISCONTINUED | OUTPATIENT
Start: 2021-07-21 | End: 2021-07-21 | Stop reason: HOSPADM

## 2021-07-21 RX ORDER — PROPOFOL 10 MG/ML
INJECTION, EMULSION INTRAVENOUS AS NEEDED
Status: DISCONTINUED | OUTPATIENT
Start: 2021-07-21 | End: 2021-07-21 | Stop reason: HOSPADM

## 2021-07-21 RX ORDER — LIDOCAINE HYDROCHLORIDE ANHYDROUS AND DEXTROSE MONOHYDRATE .8; 5 G/100ML; G/100ML
1 INJECTION, SOLUTION INTRAVENOUS CONTINUOUS
Status: DISCONTINUED | OUTPATIENT
Start: 2021-07-21 | End: 2021-07-21 | Stop reason: HOSPADM

## 2021-07-21 RX ORDER — SODIUM CHLORIDE, SODIUM LACTATE, POTASSIUM CHLORIDE, CALCIUM CHLORIDE 600; 310; 30; 20 MG/100ML; MG/100ML; MG/100ML; MG/100ML
75 INJECTION, SOLUTION INTRAVENOUS CONTINUOUS
Status: DISCONTINUED | OUTPATIENT
Start: 2021-07-21 | End: 2021-07-21 | Stop reason: HOSPADM

## 2021-07-21 RX ORDER — ROCURONIUM BROMIDE 10 MG/ML
INJECTION, SOLUTION INTRAVENOUS AS NEEDED
Status: DISCONTINUED | OUTPATIENT
Start: 2021-07-21 | End: 2021-07-21 | Stop reason: HOSPADM

## 2021-07-21 RX ORDER — KETOROLAC TROMETHAMINE 30 MG/ML
15 INJECTION, SOLUTION INTRAMUSCULAR; INTRAVENOUS EVERY 6 HOURS
Status: COMPLETED | OUTPATIENT
Start: 2021-07-21 | End: 2021-07-22

## 2021-07-21 RX ORDER — DIPHENHYDRAMINE HYDROCHLORIDE 50 MG/ML
12.5 INJECTION, SOLUTION INTRAMUSCULAR; INTRAVENOUS
Status: DISCONTINUED | OUTPATIENT
Start: 2021-07-21 | End: 2021-07-22 | Stop reason: HOSPADM

## 2021-07-21 RX ORDER — APREPITANT 40 MG/1
40 CAPSULE ORAL ONCE
Status: DISCONTINUED | OUTPATIENT
Start: 2021-07-21 | End: 2021-07-21 | Stop reason: HOSPADM

## 2021-07-21 RX ADMIN — GABAPENTIN 200 MG: 100 CAPSULE ORAL at 15:53

## 2021-07-21 RX ADMIN — OXYCODONE 5 MG: 5 TABLET ORAL at 20:09

## 2021-07-21 RX ADMIN — ACETAMINOPHEN 1000 MG: 500 TABLET, FILM COATED ORAL at 21:36

## 2021-07-21 RX ADMIN — ONDANSETRON 4 MG: 2 INJECTION INTRAMUSCULAR; INTRAVENOUS at 10:58

## 2021-07-21 RX ADMIN — ACETAMINOPHEN 1000 MG: 500 TABLET, FILM COATED ORAL at 08:55

## 2021-07-21 RX ADMIN — PHENYLEPHRINE HYDROCHLORIDE 50 MCG: 10 INJECTION INTRAVENOUS at 10:20

## 2021-07-21 RX ADMIN — PROMETHAZINE HYDROCHLORIDE 6.25 MG: 25 INJECTION INTRAMUSCULAR; INTRAVENOUS at 11:43

## 2021-07-21 RX ADMIN — ONDANSETRON 4 MG: 2 INJECTION INTRAMUSCULAR; INTRAVENOUS at 15:54

## 2021-07-21 RX ADMIN — KETAMINE HYDROCHLORIDE 40 MG: 50 INJECTION, SOLUTION INTRAMUSCULAR; INTRAVENOUS at 09:57

## 2021-07-21 RX ADMIN — HEPARIN SODIUM 5000 UNITS: 5000 INJECTION INTRAVENOUS; SUBCUTANEOUS at 18:22

## 2021-07-21 RX ADMIN — MIDAZOLAM 2 MG: 1 INJECTION INTRAMUSCULAR; INTRAVENOUS at 09:32

## 2021-07-21 RX ADMIN — PHENYLEPHRINE HYDROCHLORIDE 100 MCG: 10 INJECTION INTRAVENOUS at 10:29

## 2021-07-21 RX ADMIN — LIDOCAINE HYDROCHLORIDE 1 MG/KG/HR: 8 INJECTION, SOLUTION INTRAVENOUS at 09:56

## 2021-07-21 RX ADMIN — SUCRALFATE 1 G: 1 TABLET ORAL at 21:36

## 2021-07-21 RX ADMIN — LIDOCAINE HYDROCHLORIDE 100 MG: 20 INJECTION, SOLUTION EPIDURAL; INFILTRATION; INTRACAUDAL; PERINEURAL at 09:55

## 2021-07-21 RX ADMIN — PHENYLEPHRINE HYDROCHLORIDE 100 MCG: 10 INJECTION INTRAVENOUS at 10:50

## 2021-07-21 RX ADMIN — HYDROMORPHONE HYDROCHLORIDE 0.5 MG: 2 INJECTION, SOLUTION INTRAMUSCULAR; INTRAVENOUS; SUBCUTANEOUS at 11:47

## 2021-07-21 RX ADMIN — PHENYLEPHRINE HYDROCHLORIDE 100 MCG: 10 INJECTION INTRAVENOUS at 10:26

## 2021-07-21 RX ADMIN — ONDANSETRON 4 MG: 2 INJECTION INTRAMUSCULAR; INTRAVENOUS at 23:02

## 2021-07-21 RX ADMIN — SODIUM CHLORIDE, SODIUM LACTATE, POTASSIUM CHLORIDE, AND CALCIUM CHLORIDE 25 ML/HR: 600; 310; 30; 20 INJECTION, SOLUTION INTRAVENOUS at 09:15

## 2021-07-21 RX ADMIN — SUCCINYLCHOLINE CHLORIDE 200 MG: 20 INJECTION, SOLUTION INTRAMUSCULAR; INTRAVENOUS at 09:55

## 2021-07-21 RX ADMIN — Medication 5 MG: at 10:20

## 2021-07-21 RX ADMIN — APREPITANT 40 MG: 40 CAPSULE ORAL at 08:55

## 2021-07-21 RX ADMIN — ROCURONIUM BROMIDE 40 MG: 10 INJECTION, SOLUTION INTRAVENOUS at 10:02

## 2021-07-21 RX ADMIN — ACETAMINOPHEN 1000 MG: 500 TABLET, FILM COATED ORAL at 15:53

## 2021-07-21 RX ADMIN — METOCLOPRAMIDE 5 MG: 10 TABLET ORAL at 08:55

## 2021-07-21 RX ADMIN — ROCURONIUM BROMIDE 10 MG: 10 INJECTION, SOLUTION INTRAVENOUS at 10:41

## 2021-07-21 RX ADMIN — PROPOFOL 200 MG: 10 INJECTION, EMULSION INTRAVENOUS at 09:55

## 2021-07-21 RX ADMIN — PHENYLEPHRINE HYDROCHLORIDE 100 MCG: 10 INJECTION INTRAVENOUS at 10:14

## 2021-07-21 RX ADMIN — CEFAZOLIN 3 G: 1 INJECTION, POWDER, FOR SOLUTION INTRAVENOUS at 10:01

## 2021-07-21 RX ADMIN — Medication 5 MG: at 10:50

## 2021-07-21 RX ADMIN — HYDROMORPHONE HYDROCHLORIDE 0.5 MG: 2 INJECTION, SOLUTION INTRAMUSCULAR; INTRAVENOUS; SUBCUTANEOUS at 11:52

## 2021-07-21 RX ADMIN — SUCRALFATE 1 G: 1 TABLET ORAL at 15:53

## 2021-07-21 RX ADMIN — SUGAMMADEX 300 MG: 100 INJECTION, SOLUTION INTRAVENOUS at 11:14

## 2021-07-21 RX ADMIN — KETOROLAC TROMETHAMINE 15 MG: 30 INJECTION, SOLUTION INTRAMUSCULAR; INTRAVENOUS at 15:54

## 2021-07-21 RX ADMIN — PHENYLEPHRINE HYDROCHLORIDE 50 MCG: 10 INJECTION INTRAVENOUS at 10:25

## 2021-07-21 RX ADMIN — HEPARIN SODIUM 5000 UNITS: 5000 INJECTION INTRAVENOUS; SUBCUTANEOUS at 09:27

## 2021-07-21 RX ADMIN — ONDANSETRON 4 MG: 2 INJECTION INTRAMUSCULAR; INTRAVENOUS at 18:22

## 2021-07-21 RX ADMIN — Medication 5 MG: at 10:26

## 2021-07-21 RX ADMIN — PHENYLEPHRINE HYDROCHLORIDE 150 MCG: 10 INJECTION INTRAVENOUS at 10:35

## 2021-07-21 RX ADMIN — FENTANYL CITRATE 100 MCG: 50 INJECTION INTRAMUSCULAR; INTRAVENOUS at 09:53

## 2021-07-21 RX ADMIN — GABAPENTIN 200 MG: 100 CAPSULE ORAL at 21:36

## 2021-07-21 RX ADMIN — PHENYLEPHRINE HYDROCHLORIDE 100 MCG: 10 INJECTION INTRAVENOUS at 10:38

## 2021-07-21 RX ADMIN — KETOROLAC TROMETHAMINE 15 MG: 30 INJECTION, SOLUTION INTRAMUSCULAR; INTRAVENOUS at 18:22

## 2021-07-21 RX ADMIN — PHENYLEPHRINE HYDROCHLORIDE 100 MCG: 10 INJECTION INTRAVENOUS at 10:09

## 2021-07-21 NOTE — ANESTHESIA PREPROCEDURE EVALUATION
Anesthetic History   No history of anesthetic complications            Review of Systems / Medical History  Patient summary reviewed and pertinent labs reviewed    Pulmonary  Within defined limits                 Neuro/Psych         Psychiatric history (Depression)     Cardiovascular    Hypertension              Exercise tolerance[de-identified] Activity is limited by weight but she denied chest pain, syncope. Does get intermittent PALACIOS     GI/Hepatic/Renal     GERD: well controlled           Endo/Other        Morbid obesity (Super morbid obesity - BMI 81)     Other Findings              Physical Exam    Airway  Mallampati: II  TM Distance: 4 - 6 cm  Neck ROM: normal range of motion, short neck   Mouth opening: Normal     Cardiovascular  Regular rate and rhythm,  S1 and S2 normal,  no murmur, click, rub, or gallop            Comments: Distant heart sounds Dental  No notable dental hx       Pulmonary  Breath sounds clear to auscultation              Comments: Distant breath sounds.  Abdominal  GI exam deferred       Other Findings            Anesthetic Plan    ASA: 4  Anesthesia type: general  ETT  Glidescope  ERAS - lidocaine drip + ketamine boluses         Induction: Intravenous  Anesthetic plan and risks discussed with: Patient and Spouse

## 2021-07-21 NOTE — PERIOP NOTES
TRANSFER - OUT REPORT:    Verbal report given to Jina Engle RN on Omuou Mayorga  being transferred to [facility] for routine post - op       Report consisted of patients Situation, Background, Assessment and   Recommendations(SBAR). Information from the following report(s) SBAR, OR Summary, Intake/Output, MAR and Cardiac Rhythm NSR was reviewed with the receiving nurse. Lines:   Peripheral IV 07/21/21 Anterior;Right Hand (Active)   Site Assessment Clean, dry, & intact 07/21/21 1220   Phlebitis Assessment 0 07/21/21 1220   Infiltration Assessment 0 07/21/21 1220   Dressing Status Clean, dry, & intact 07/21/21 1220   Dressing Type Transparent;Tape 07/21/21 1220   Hub Color/Line Status Green; Infusing;Patent 07/21/21 1220        Opportunity for questions and clarification was provided. Patient transported with:   O2 @ 4 liters  Tech    VTE prophylaxis orders have been written for Oumou Mayorga. Patient and family given floor number and nurses name. Family updated re: pt status after security code verified.

## 2021-07-21 NOTE — H&P
Shukri Thapa MD   Bariatric & Advanced Laparoscopic Surgery & Endoscopy  [address] [place] [address]  Salvatore Kim  Phone [phone]   Fax [phone]      Date of visit: 2021      Primary/Requesting provider: Dong Hurd MD         Name: Duane Brunette      MRN: [id number]       : 1975       Age: 41 y.o. Sex: female        PCP: Dong Hurd MD     CC:    No chief complaint on file. Procedure: laparoscopic sleeve gastrectomy    Surgical Consult Date: 2021    Surgical Date: TBD     The patient is a 41 y.o. female presenting with a height of 5' 2.01\" (1.575 m) and weight of (!) 444 lb 8 oz (201.6 kg), giving her a Body mass index is 81.28 kg/m². She has an Ideal body weight of 136 lbs, and excess body weight of 317 lbs. She started our program with a weight of 459 lbs, losing 6 lbs. She has completed all aspects of our prep program and has been deemed an acceptable candidate for bariatric surgery. 30-Day Bariatric Surgical Risk Percentage: 4.50% Sleeve, 9.24%    PSYCHOLOGICAL EVALUATION:   Completed, 2021 with Dr. Rosy Roberts deeming her and appropriate surgical candidate. DIETITIAN EVALUATION:  Completed with Liseth Hanson deeming her an appropriate surgical candidate. BARIATRIC LABS:  Completed, 2021     CARDIAC CLEARANCE:  Completed, 2021 by Dr. Jarod Panchal at [facility] Cardiology    PULMONARY CLEARANCE:  Completed, 2021 with Antwon Bay at [facility] Pulmonary    UPPER GI:  Completed, 2021  Clinical History: The Female patient is 41years old presenting for pre-op  gastric sleeve.     Comparison:  None     Findings:       Fluoroscopic and film studies demonstrate the esophagus to be normal in course,  contour, caliber. There is no evidence of mucosal irregularity.  A small hiatal  hernia is seen with minimal gastroesophageal reflux demonstrated during the  exam.     The stomach is normal in position with unobstructed gastric emptying into the  proximal small bowel. No mucosal irregularity is demonstrated.     The duodenal bulb is unremarkable as are visualized portions of the proximal  small bowel.     Total images: 15  Total fluoroscopy time: 1.2 minutes     IMPRESSION  1. Small hiatal hernia with minimal gastroesophageal reflux. EGD:  Completed, 04/26/2021  Procedure in Detail:  Informed consent was obtained for the procedure, the patient was brought to the endoscopy suite and sedation was induced by anesthesia. The [id number] gastroscope was inserted into the mouth and advanced under direct vision to second portion of the duodenum. A careful inspection was made as the gastroscope was withdrawn, including a retroflexed view of the proximal stomach; findings and interventions are described below, with appropriate photodocumentation obtained.     Findings:   Oropharynx:   Normal  Esophagus:       - tiny hiatal hernia  GEJ 36 cm  Cardia of the stomach:    Normal  Body of the stomach:      - Excavated lesions:     - Erosions    - Flat lesions:     - mild gastritis  Fundus of the stomach:    Normal  Antrum of the stomach:      - Excavated lesions:     - Erosions    - Flat lesions:     - mild gastritis  Duodenum:    Normal        Therapies:    biopsy of stomach body, antrum, pre pyloric antrum     EBL-  minimal     Specimens: Specimens were collected and sent to pathology. ID Type Source Tests Collected by Time Destination   1 : gastric bxs Preservative     Deidre Hurley MD 4/26/2021 1045 Pathology   1 : rony test Tissue Gastric   Deidre Hurley MD 4/26/2021 1045 Microbiology               Complications:   None; patient tolerated the procedure well. Recommendations:  - Acid suppression with a proton pump inhibitor.  - Await pathology. - Await RONY test result and treat for Helicobacter pylori if positive.       PHYSICAL THERAPY EVALUATION FOR MOBILITY OPTIMIZATION: Completed    We have reviewed the procedure again today and completed our standard pre op teaching. Her questions were answered and she is ready to schedule surgery. We have discussed the procedure, diet and exercise regimens, and potential complications of surgery. The patient understands the nature and potential complication of surgery and has the capacity to follow the post operative diet, exercise, and nutritional requirements. After review, she has signed her surgical consent today. MEDICAL HISTORY:  Morbid Obesity  Depression   Anxiety  PCOS    Comorbidity Yes or No   Hypertension- how many medications= 1 Yes   Hyperlipidemia No   Diabetes Mellitus  Insulin dependent =  Last A1c= No   Coronary Artery Disease No   Gastroesophageal Reflux  Treatment Med= No   Obstructive Sleep Apnea No   Cancer No   Asthma No   Osteoarthritis No   Joint Pain No       Other Yes or No   Venous Stasis No   Dialysis No   Long term use of steroids or immunocompromised conditions No   On Anticoagulants No     PRIOR WEIGHT LOSS ATTEMPTS:  >15 attempts including Weight Watchers, Medication, low fat, Keto    EXERCISE ASSESSMENT:  Walking 2x week. Reviewed NIH guidelines. DENTAL ISSUES:  No dental issues with chewing    PSYCHOSOCIAL:  She notes she  is  and states main support people are Her spouse and mother.  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   Her goal in pursuing surgical weight loss is to improve health. She reports appropriate treatment of depression and anxiety.  She states she is independent in her care, can drive a car, and can ambulate without assistance. Patient has a long term history of obesity with multiple failed attempts at weight reduction. Patient denies any changes in health history or physical health since last visit. Patient has been adherent to her diet and exercise regimen.   Patient feels that she is well informed regarding her bariatric surgery choice and would like to proceed with a laparoscopic sleeve gastrectomy for weight reduction, improvement of her medical conditions, and improved quality of life. Patient verbalized understanding of the risks associated with bariatric surgery. Patient also verbalized understanding that bariatric surgery is a tool and that weight reduction is dependent on behavioral changes in regards to what she eats and how much.     PMH:    Past Medical History:   Diagnosis Date    Depression     managed with medication     Hypertension     managed with medication     Morbid obesity ([address]     Thyroid disease     during preg       PSH:    Past Surgical History:   Procedure Laterality Date    HX ADENOIDECTOMY  2003    HX  SECTION  ,     HX CHOLECYSTECTOMY      HX TONSILLECTOMY      HX TUBAL LIGATION         MEDS:    Current Facility-Administered Medications   Medication    ceFAZolin (ANCEF) 3 g/30 mL in sterile water IV syringe    heparin (porcine) injection 5,000 Units    scopolamine (TRANSDERM-SCOP) 1 mg over 3 days 1 Patch    lidocaine (XYLOCAINE) 10 mg/mL (1 %) injection 0.1 mL    lactated Ringers infusion    fentaNYL citrate (PF) injection 100 mcg    midazolam (VERSED) injection 2 mg    midazolam (VERSED) injection 2 mg    naloxone (NARCAN) injection 0.2 mg    scopolamine (TRANSDERM-SCOP) 1 mg over 3 days 1 Patch    aprepitant (EMEND) capsule 40 mg       ALLERGIES:      Allergies   Allergen Reactions    Tape [Adhesive] Other (comments)     Paper tape- Causes Blisters       SH:    Social History     Tobacco Use    Smoking status: Former Smoker    Smokeless tobacco: Never Used    Tobacco comment: Social smoker in the past in early 20's   Substance Use Topics    Alcohol use: Not Currently    Drug use: Never       FH:    Family History   Problem Relation Age of Onset    No Known Problems Mother     Diabetes Father     Hypertension Father     COPD Father           Physical Exam: Visit Vitals  BP (!) 172/82 (BP Patient Position: Sitting)   Pulse 88   Temp 97.5 °F (36.4 °C)   Resp 18   Ht 5' 2.01\" (1.575 m)   Wt (!) 444 lb 8 oz (201.6 kg)   SpO2 94%   BMI 81.28 kg/m²       General:  Well-developed, well-nourished, no distress. Psych:  Cooperative, good insight and judgement. Neuro:  Alert, oriented to person, place and time. HEENT:  Normocephalic, atraumatic. Sclera clear. Lungs:  Unlabored breathing. Symmetrical chest expansion. Chest wall:  No tenderness or deformity. Heart:  Regular rate and rhythm. No JVD. Abdomen:  Soft, obese, non-tender, non-distended. No guarding or rebound. No palpable masses. Well healed incision from previous cholecystectomy and . Extremities:  Extremities normal, atraumatic, no cyanosis or edema. Skin:  Skin color, texture, turgor normal. No rashes. Labs: All recent labs were reviewed. Imaging: All images were independently reviewed by me. Normal Hgb. Normal TSH. Assessment/Plan:  Bianca Saravia is a 41 y.o. female is here her preoperative visit prior to bariatric surgery. 1. Patient is an excellent candidate for bariatric surgery and meets NIH criteria for weight loss surgery. Patient has clear medical necessity for bariatric surgery. Patient is well informed, motivated, and has a strong desire for weight loss. 2. In detail, discussed risks and benefits of laparoscopic sleeve gastrectomy. We discussed specific risks of sleeve gastrectomy including but not limited to: pain, infection, bleeding, scar, excess skin, conversion to open approach, hernia, injury to adjacent organs, need for blood transfusion, thromboembolic complications, gastrointestinal leak, stricture, difficulty swallowing, need for revisional surgery, gastroesophageal reflux, esophagitis or esophageal cancer, need for revisional surgery, failure to lose weight or weight regain, nutritional deficiencies, heart attack, stroke, death.          Signed: Sully Garcias MD  Bariatric & Minimally Invasive Surgery  7/21/2021

## 2021-07-21 NOTE — PROGRESS NOTES
General Surgery Post-op Note    Patient: Irma Guthrie  MRN: [id number]  Date: 7/21/2021 3:59 PM  Procedure: Robotic assisted sleeve gastrectomy    Subjective:     Irma Guthrie is a 41 y.o. female s/p robotic assisted sleeve gastrectomy completed today by Dr. Neil Hernandez. She admits to mild abdominal pain, but denies nausea or vomiting. She denies chest pain, shortness of breath, or lower extremity pain. Discussed pain medication and antiemetics, ambulation, incentive spirometer, and PO intake. Objective:     Visit Vitals  /67 (BP 1 Location: Left lower arm, BP Patient Position: At rest)   Pulse 95   Temp (!) 96.1 °F (35.6 °C)   Resp 16   Ht 5' 2.01\" (1.575 m)   Wt (!) 444 lb 8 oz (201.6 kg)   SpO2 99%   Breastfeeding No   BMI 81.28 kg/m²       Intake/Output Summary (Last 24 hours) at 7/21/2021 1559  Last data filed at 7/21/2021 1109  Gross per 24 hour   Intake 1700 ml   Output 20 ml   Net 1680 ml        Exam:  General: Alert, oriented, cooperative in no acute distress. Resp:  Breathing is non-labored. CV: Regular rate and rhythm. Abd: Soft, not distended. Incision sites are dry, intact, without presence of erythema, infection, or allergic reaction. Plan:   Patient will remain in the hospital overnight. Discussed the use of incentive spirometer, early ambulation, and PO intake. Reviewed important signs and symptoms including chest pain, shortness of breath, increasing abdominal pain. All of their questions were answered, and the patient is happy with this plan. Plan for discharge 1-2 days depending upon symptoms and progress.      Hospital Problems  Date Reviewed: 4/21/2021        Codes Class Noted POA    Morbid obesity ([facility] ICD-10-CM: E66.01  ICD-9-CM: 278.01  3/16/2021 Unknown              Yesenia Walden PA-C  Date: 7/21/2021  Time: 3:59 PM

## 2021-07-21 NOTE — OP NOTES
Operative Report    Name: Jacquetta Lesches      MRN: [id number]       : 1975       Age: 41 y.o. Sex: female    Date of Surgery: 2021     Preoperative Diagnosis: Morbid obesity ([facility] [E66.01]     Postoperative Diagnosis: Morbid obesity ([facility] [E66.01]     Name of procedure:    1. ROBOTIC ASSISTED LAPAROSCOPIC SLEEVE GASTRECTOMY CPT 68400  2. ESOPHAGOGASTRODUODENOSCOPY     Surgeon: Lisette Liang MD     Assistant: Bertram Judge assistance was needed due to the complexity of this case. She assisted with camera driving, and laparoscopic retraction and suctioning. Anesthesia: General     Complications: None    Estimated Blood Loss: Minimal           Specimens:   ID Type Source Tests Collected by Time Destination   1 : portion of stomach Fresh   Robert Taveras MD 2021 1045 Pathology       Implants:  * No implants in log *    Findings:  Normal appearing intra-abdominal anatomy. Negative leak test. Normal appearing gastric and esophageal mucousa without staple line bleeding. Statement of Medical Necessity: Jacquetta Lesches is a 41 y.o. female who presented to the clinic with history of morbid obesity and Body mass index is 81.28 kg/m². Stacy Hoyt She failed multiple weight loss attempts meets the NIH criteria for obesity surgery. She decided for a laparoscopic vs open sleeve gastrectomy. We discussed specific risks of sleeve gastrectomy including but not limited to: pain, infection, bleeding, scar, excess skin, conversion to open approach, hernia, injury to adjacent organs, need for blood transfusion, thromboembolic complications, gastrointestinal leak, stricture, difficulty swallowing, need for revisional surgery, gastroesophageal reflux, esophagitis or esophageal cancer, need for revisional surgery, failure to lose weight or weight regain, nutritional deficiencies, heart attack, stroke, death. Patient understood and agreed to proceed.      Procedure Details   After informed consent was taken, patient was taken to the operating room and placed in supine position with padding in all pressure points. Anesthesia was induced and patient was intubated. Patient received preoperative antibiotics. Abdomen was prepped and draped in standard sterile fashion. A timeout was performed with all team members present. A 1 cm horizontal incision was made 17 cm from sternal notch to the left of the umbilicus. A Veress needle was inserted. Saline drop test was normal. The abdomen was insufflated with carbon dioxide to a pressure of 15 mmHg. The patient tolerated the insufflation well. A 8 mm robotic trocar was inserted bluntly. A general survey was performed and no injury was observed from trocar placement. A 12 mm robotic trocar was inserted about 10 cm to the left of the previously placed port. Two 8 mm robotic trocar were carefully placed under direct visualization in the left upper quadrant. with no introducer. A 5 mm tunnel was created distal and left lateral to the xiphoid, a Aung liver retractor advanced, and the liver retracted. A bilateral abdominal tap block was performed using Marcaine. The patient was placed in 22 degrees of reverse Trendelenberg and 6 degrees of right side tilt. The robot was docked. The robotic instruments were carefully inserted under direct visualization. A premeasured instrument was used to adam the position 7 cm proximal to the pylorus along the greater curvature. The stomach was grasped and elevated. A Vessel sealer extend was used to divide the gastrocolic ligament and enter the lesser sac. The greater curvature of the stomach was divided free from its gastrocolic attachments in its entirety, to the level of the spleen. Next, a 43 Kinyarwanda blunt bougie was advanced along the lesser curvature to the pylorus.  Using 6 sequential firings of DaVinci 60 mm smart stapler platform, green (1), blue (3) and white (2) endostaplers, the stomach was divided making sure to not leave any redundant fundus. The greater curvature segment was placed in the right upper quadrant of the abdomen. The bougie was withdrawn. The lower part of the sleeve was fixated to the retroperitoneum using a 3-0 Vicryl suture to prevent twisting or kinking. An esophagogastroduodenoscopy advanced into the stomach. Using distal occlusion, insufflation of the stomach and immersion in sterile saline, a leak test was performed. No leak along the staple line was present. There was also no intraluminal bleeding or abnormalities of the stomach appreciated. The air was aspirated and the esophagogastroduodenoscopy withdrawn. The intraperitoneal saline was aspirated. Hemostasis was assured. All robotic instruments were removed and the robot was undocked. The liver retractor was removed under direct vision. The stomach was removed through the 12 mm port. This incision was closed at the level of the fascia with 0-vicryl sutures and an endoclosure device. The wounds were irrigated with betadine solution and closed with 4-0 Monocryl in a subcuticular fashion. Dermabond was applied to the skin incisions. All counts were reported as correct. The patient tolerated the procedure well and there were no immediate complications. Disposition: the patient was awakened from anesthesia and transferred to the PACU in stable condition.          Signed by: Monico Fonseca MD  [facility] - Bariatric & Minimally Invasive Surgery  7/21/2021 11:20 AM

## 2021-07-22 VITALS
TEMPERATURE: 97.7 F | BODY MASS INDEX: 53.92 KG/M2 | HEART RATE: 92 BPM | DIASTOLIC BLOOD PRESSURE: 64 MMHG | RESPIRATION RATE: 18 BRPM | SYSTOLIC BLOOD PRESSURE: 113 MMHG | OXYGEN SATURATION: 94 % | WEIGHT: 293 LBS | HEIGHT: 62 IN

## 2021-07-22 LAB
ANION GAP SERPL CALC-SCNC: 3 MMOL/L (ref 7–16)
BASOPHILS # BLD: 0 K/UL (ref 0–0.2)
BASOPHILS NFR BLD: 0 % (ref 0–2)
BUN SERPL-MCNC: 11 MG/DL (ref 6–23)
CALCIUM SERPL-MCNC: 7.9 MG/DL (ref 8.3–10.4)
CHLORIDE SERPL-SCNC: 107 MMOL/L (ref 98–107)
CO2 SERPL-SCNC: 29 MMOL/L (ref 21–32)
CREAT SERPL-MCNC: 0.58 MG/DL (ref 0.6–1)
DIFFERENTIAL METHOD BLD: ABNORMAL
EOSINOPHIL # BLD: 0 K/UL (ref 0–0.8)
EOSINOPHIL NFR BLD: 0 % (ref 0.5–7.8)
ERYTHROCYTE [DISTWIDTH] IN BLOOD BY AUTOMATED COUNT: 13.9 % (ref 11.9–14.6)
GLUCOSE SERPL-MCNC: 101 MG/DL (ref 65–100)
HCT VFR BLD AUTO: 39 % (ref 35.8–46.3)
HGB BLD-MCNC: 12.8 G/DL (ref 11.7–15.4)
IMM GRANULOCYTES # BLD AUTO: 0 K/UL (ref 0–0.5)
IMM GRANULOCYTES NFR BLD AUTO: 0 % (ref 0–5)
LYMPHOCYTES # BLD: 1.8 K/UL (ref 0.5–4.6)
LYMPHOCYTES NFR BLD: 24 % (ref 13–44)
MCH RBC QN AUTO: 30 PG (ref 26.1–32.9)
MCHC RBC AUTO-ENTMCNC: 32.8 G/DL (ref 31.4–35)
MCV RBC AUTO: 91.3 FL (ref 79.6–97.8)
MONOCYTES # BLD: 0.9 K/UL (ref 0.1–1.3)
MONOCYTES NFR BLD: 12 % (ref 4–12)
NEUTS SEG # BLD: 4.8 K/UL (ref 1.7–8.2)
NEUTS SEG NFR BLD: 63 % (ref 43–78)
NRBC # BLD: 0 K/UL (ref 0–0.2)
PLATELET # BLD AUTO: 195 K/UL (ref 150–450)
PMV BLD AUTO: 11.5 FL (ref 9.4–12.3)
POTASSIUM SERPL-SCNC: 3.5 MMOL/L (ref 3.5–5.1)
RBC # BLD AUTO: 4.27 M/UL (ref 4.05–5.2)
SODIUM SERPL-SCNC: 139 MMOL/L (ref 136–145)
WBC # BLD AUTO: 7.6 K/UL (ref 4.3–11.1)

## 2021-07-22 PROCEDURE — 74011000250 HC RX REV CODE- 250: Performed by: SURGERY

## 2021-07-22 PROCEDURE — 74011250636 HC RX REV CODE- 250/636: Performed by: SURGERY

## 2021-07-22 PROCEDURE — APPNB30 APP NON BILLABLE TIME 0-30 MINS: Performed by: PHYSICIAN ASSISTANT

## 2021-07-22 PROCEDURE — 74011250637 HC RX REV CODE- 250/637: Performed by: SURGERY

## 2021-07-22 PROCEDURE — 97161 PT EVAL LOW COMPLEX 20 MIN: CPT

## 2021-07-22 PROCEDURE — 85025 COMPLETE CBC W/AUTO DIFF WBC: CPT

## 2021-07-22 PROCEDURE — C9113 INJ PANTOPRAZOLE SODIUM, VIA: HCPCS | Performed by: SURGERY

## 2021-07-22 PROCEDURE — 80048 BASIC METABOLIC PNL TOTAL CA: CPT

## 2021-07-22 PROCEDURE — 97110 THERAPEUTIC EXERCISES: CPT

## 2021-07-22 PROCEDURE — 36415 COLL VENOUS BLD VENIPUNCTURE: CPT

## 2021-07-22 RX ADMIN — ONDANSETRON 4 MG: 2 INJECTION INTRAMUSCULAR; INTRAVENOUS at 03:33

## 2021-07-22 RX ADMIN — HEPARIN SODIUM 5000 UNITS: 5000 INJECTION INTRAVENOUS; SUBCUTANEOUS at 03:33

## 2021-07-22 RX ADMIN — ACETAMINOPHEN 1000 MG: 500 TABLET, FILM COATED ORAL at 09:47

## 2021-07-22 RX ADMIN — KETOROLAC TROMETHAMINE 15 MG: 30 INJECTION, SOLUTION INTRAMUSCULAR; INTRAVENOUS at 01:01

## 2021-07-22 RX ADMIN — SODIUM CHLORIDE 40 MG: 9 INJECTION, SOLUTION INTRAMUSCULAR; INTRAVENOUS; SUBCUTANEOUS at 09:47

## 2021-07-22 RX ADMIN — SODIUM CHLORIDE, SODIUM LACTATE, POTASSIUM CHLORIDE, AND CALCIUM CHLORIDE 125 ML/HR: 600; 310; 30; 20 INJECTION, SOLUTION INTRAVENOUS at 04:09

## 2021-07-22 RX ADMIN — ACETAMINOPHEN 1000 MG: 500 TABLET, FILM COATED ORAL at 03:33

## 2021-07-22 RX ADMIN — SUCRALFATE 1 G: 1 TABLET ORAL at 12:22

## 2021-07-22 RX ADMIN — ONDANSETRON 4 MG: 2 INJECTION INTRAMUSCULAR; INTRAVENOUS at 06:10

## 2021-07-22 RX ADMIN — SUCRALFATE 1 G: 1 TABLET ORAL at 09:47

## 2021-07-22 RX ADMIN — ONDANSETRON 4 MG: 2 INJECTION INTRAMUSCULAR; INTRAVENOUS at 12:21

## 2021-07-22 RX ADMIN — GABAPENTIN 200 MG: 100 CAPSULE ORAL at 09:47

## 2021-07-22 RX ADMIN — KETOROLAC TROMETHAMINE 15 MG: 30 INJECTION, SOLUTION INTRAMUSCULAR; INTRAVENOUS at 06:10

## 2021-07-22 RX ADMIN — HEPARIN SODIUM 5000 UNITS: 5000 INJECTION INTRAVENOUS; SUBCUTANEOUS at 12:21

## 2021-07-22 NOTE — PROGRESS NOTES
Bariatric Surgery Daily Progress Note    Patient: Yunier Hansen  MRN: [id number]  Date: 7/22/2021 7:11 AM  Admit Date: 7/21/2021  Procedure: Robotic assisted sleeve gastrectomy    Subjective:     Yunier Hansen is a 41 y.o. female who is POD #1 s/p robotic assisted sleeve gastrectomy completed by Dr. Leonard Tejada. She admits that she is doing well today, with mild abdominal pain and no nausea or vomiting. She has been OOB ambulating, voiding without difficulty, using the incentive spirometer, and tolerating slow PO intake. She denies chest pain, shortness of breath, or lower extremity pain.      Objective:     MEDS:    Current Facility-Administered Medications   Medication    lactated Ringers infusion    acetaminophen (TYLENOL) tablet 1,000 mg    gabapentin (NEURONTIN) capsule 200 mg    HYDROmorphone (DILAUDID) injection 0.5 mg    naloxone (NARCAN) injection 0.4 mg    oxyCODONE IR (ROXICODONE) tablet 5 mg    fat emulsion 20% (LIPOSYN, INTRAlipid) infusion    ondansetron (ZOFRAN) injection 4 mg    heparin (porcine) injection 5,000 Units    diphenhydrAMINE (BENADRYL) injection 12.5 mg    sucralfate (CARAFATE) tablet 1 g    promethazine (PHENERGAN) with saline injection 12.5 mg    scopolamine (TRANSDERM-SCOP) 1 mg over 3 days 1 Patch    pantoprazole (PROTONIX) 40 mg in 0.9% sodium chloride 10 mL injection       ALLERGIES:       Allergies   Allergen Reactions    Tape [Adhesive] Other (comments)     Paper tape- Causes Blisters       I/O:      Intake/Output Summary (Last 24 hours) at 7/22/2021 0806  Last data filed at 7/22/2021 0110  Gross per 24 hour   Intake 1700 ml   Output 520 ml   Net 1180 ml           Physical Exam:     Visit Vitals  /73 (BP 1 Location: Left lower arm, BP Patient Position: At rest)   Pulse 90   Temp 98.3 °F (36.8 °C)   Resp 18   Ht 5' 2.01\" (1.575 m)   Wt (!) 444 lb 8 oz (201.6 kg)   SpO2 90%   Breastfeeding No   BMI 81.28 kg/m²       General:  Alert, oriented, cooperative in no acute distress. Neuro:  Alert, oriented to person, place and time. Lungs:  Unlabored breathing. Symmetrical chest expansion. Heart: Regular rate and rhythm. Abdomen:  Soft, appropriately tender at incision sites. Lap incisions C/D/I without presence of erythema, infection, or allergic reaction. Abdominal binder in place. Extremities:  Extremities normal, atraumatic, no cyanosis or edema. Labs: All recent labs were reviewed. Recent Results (from the past 24 hour(s))   CBC WITH AUTOMATED DIFF    Collection Time: 07/22/21  6:04 AM   Result Value Ref Range    WBC 7.6 4.3 - 11.1 K/uL    RBC 4.27 4.05 - 5.2 M/uL    HGB 12.8 11.7 - 15.4 g/dL    HCT 39.0 35.8 - 46.3 %    MCV 91.3 79.6 - 97.8 FL    MCH 30.0 26.1 - 32.9 PG    MCHC 32.8 31.4 - 35.0 g/dL    RDW 13.9 11.9 - 14.6 %    PLATELET 458 147 - 516 K/uL    MPV 11.5 9.4 - 12.3 FL    ABSOLUTE NRBC 0.00 0.0 - 0.2 K/uL    DF AUTOMATED      NEUTROPHILS 63 43 - 78 %    LYMPHOCYTES 24 13 - 44 %    MONOCYTES 12 4.0 - 12.0 %    EOSINOPHILS 0 (L) 0.5 - 7.8 %    BASOPHILS 0 0.0 - 2.0 %    IMMATURE GRANULOCYTES 0 0.0 - 5.0 %    ABS. NEUTROPHILS 4.8 1.7 - 8.2 K/UL    ABS. LYMPHOCYTES 1.8 0.5 - 4.6 K/UL    ABS. MONOCYTES 0.9 0.1 - 1.3 K/UL    ABS. EOSINOPHILS 0.0 0.0 - 0.8 K/UL    ABS. BASOPHILS 0.0 0.0 - 0.2 K/UL    ABS. IMM. GRANS. 0.0 0.0 - 0.5 K/UL   METABOLIC PANEL, BASIC    Collection Time: 07/22/21  6:04 AM   Result Value Ref Range    Sodium 139 136 - 145 mmol/L    Potassium 3.5 3.5 - 5.1 mmol/L    Chloride 107 98 - 107 mmol/L    CO2 29 21 - 32 mmol/L    Anion gap 3 (L) 7 - 16 mmol/L    Glucose 101 (H) 65 - 100 mg/dL    BUN 11 6 - 23 MG/DL    Creatinine 0.58 (L) 0.6 - 1.0 MG/DL    GFR est AA >60 >60 ml/min/1.73m2    GFR est non-AA >60 >60 ml/min/1.73m2    Calcium 7.9 (L) 8.3 - 10.4 MG/DL       Imaging:   No results found.       Assessment/Plan:   Stephanie Edouard is a 41 y.o. female s/p Robotic assisted sleeve gastrectomy    Pain control DIET ADULT ORAL NUTRITION SUPPLEMENT  DIET ADULT ORAL NUTRITION SUPPLEMENT  DIET BARIATRIC -  Lap incisions C/D/I  Low grade tachycardia overnight (). Labs reviewed. Abdominal binder for comfort  OOB and ambulate as tolerated. PT consulted.    DVT proph - SCDs/Heparin  Discharge to home today      Familia Booker PA-C  Date: 7/22/2021

## 2021-07-22 NOTE — DISCHARGE INSTRUCTIONS
General Instructions  No bathtub or pool for 2 weeks. Ok to shower. No weight lifting greater than 20 lbs for 6 weeks. Leave the skin glue in place. It will fall off on its own in 7-10 days. Diet  Take 30 cc (1 oz) of water or protein every 30 minutes. You should be drinking about 64 ounces of fluids a day to prevent dehydration. You should be consuming about 60-80 grams of protein a day to allow for good wound healing and healthy weight loss. Medications  Take tylenol as needed for mild pain every 4-6 hours. Percocet prescribed for mod to severe pain. This is a narcotic and can cause drowsiness, nausea and vomiting and constipation. Take Colace 100mg BID (over the counter) if constipated. Take Protonix 40 mg daily for 3 months. Take Carafate 10 mL by mouth three (3) times daily for 30 days. Take Zofran 8 mg tablet as needed for nausea every 8 hours. Follow Up  Follow in bariatric clinic with Nedra Jones MD in 10 days. Your appointment should be already scheduled but if not, please call and make an appointment. Patient Education        Gastric Sleeve Surgery: What to Expect at Home  Your Recovery  Sleeve gastrectomy is surgery to remove part of the stomach to help with weight loss. The surgery, which is also called gastric sleeve surgery, limits the amount of food your stomach can hold. You will have some belly pain. You may need pain medicine for the first week or so after surgery. The cuts (incisions) that the doctor made may be tender and sore. Because the surgery makes your stomach smaller, you will get full more quickly when you eat. It's important to think of this surgery as a tool to help you lose weight. It's not an instant fix. You will still need to eat a healthy diet and get regular exercise. This will help you reach your weight goal and avoid regaining the weight you lose. It's common to have many different emotions after this surgery.  You may feel happy or excited as you begin to lose weight. But you may also feel overwhelmed or frustrated by the changes that you have to make in your diet, activity, and lifestyle. Talk with your doctor if you have concerns or questions. This care sheet gives you a general idea about how long it will take for you to recover. But each person recovers at a different pace. Follow the steps below to get better as quickly as possible. How can you care for yourself at home? Activity    · Rest when you feel tired. Getting enough sleep will help you recover.     · Try to walk each day. Start out by walking a little more than you did the day before. Bit by bit, increase the amount you walk. Walking boosts blood flow and helps prevent pneumonia and constipation.     · Avoid lifting anything that would make you strain. This may include heavy grocery bags and milk containers, a heavy briefcase or backpack, cat litter or dog food bags, a vacuum , or a child.     · Avoid strenuous activities, such as bicycle riding, jogging, weight lifting, or aerobic exercise, until your doctor says it is okay. Do not take part in any activity where you could be hit in the belly. This could be sports or playing with children.     · Hold a pillow over your incision when you cough or take deep breaths. This will support your belly and decrease your pain.     · Do breathing exercises at home as instructed by your doctor. This will help prevent pneumonia.     · You can shower. Pat the incision dry. Do not take a bath for the first 2 weeks, or until your doctor tells you it is okay.     · You may drive when you are no longer taking prescription pain medicine and can quickly move your foot from the gas pedal to the brake. You must also be able to sit comfortably for a long period of time, even if you do not plan to go far. You might get caught in traffic.     · You will probably need to take 2 to 4 weeks off from work.  It depends on the type of work you do and how you feel.     · Ask your doctor when it is okay for you to have sex. Diet    · Your doctor will give you specific instructions about what to eat after the surgery. For the first 2 to 6 weeks, you will need to follow a liquid or soft diet. Bit by bit, you will be able to add solid foods back into your diet.     · Have 5 or 6 small meals each day instead of 2 or 3 large meals. Chew each bite of food very well. Eat slowly. You may need to take 20 to 30 minutes to eat a meal.     · Avoid crusty breads, bagels, tough meats, raw vegetables, nuts and seeds (including crackers and breads that have nuts and seeds), and other foods that are hard to digest. If you feel full quickly, try to drink fluids between meals instead of with meals.     · Avoid fizzy drinks, such as soda pop.     · Avoid drinking with straws. This may help you swallow less air when you drink.     · Check with your doctor before drinking alcohol. Your body may absorb alcohol more quickly after surgery.     · You may notice that your bowel movements are not regular right after your surgery. This is common. Try to avoid constipation and straining with bowel movements. Take a fiber supplement every day. If you have not had a bowel movement after a couple of days, ask your doctor about taking a mild laxative. Medicines    · Your doctor will tell you if and when you can restart your medicines. He or she will also give you instructions about taking any new medicines.     · If you take aspirin or some other blood thinner, ask your doctor if and when to start taking it again. Make sure that you understand exactly what your doctor wants you to do.     · Take pain medicines exactly as directed. ? If the doctor gave you a prescription medicine for pain, take it as prescribed. ? Do not take two or more pain medicines at the same time unless the doctor told you to. Many pain medicines contain acetaminophen, which is Tylenol.  Too much acetaminophen (Tylenol) can be harmful. ? Do not take aspirin (Rickey, Bufferin), ibuprofen (Advil, Motrin), or naproxen (Aleve) until your doctor says it is okay.     · If you think your pain medicine is making you sick to your stomach:  ? Take your medicine after meals (unless your doctor has told you not to). ? Ask your doctor for a different pain medicine.     · If your doctor prescribed antibiotics, take them as directed. Do not stop taking them just because you feel better. You need to take the full course of antibiotics. Incision care    · If you have strips of tape on the incision, leave the tape on for a week or until it falls off.     · Wash the area daily with warm, soapy water and pat it dry. Don't use hydrogen peroxide or alcohol, which can slow healing. You may cover the area with a gauze bandage if it weeps or rubs against clothing. Change the bandage every day.     · Keep the area clean and dry. Follow-up care is a key part of your treatment and safety. Be sure to make and go to all appointments, and call your doctor if you are having problems. It's also a good idea to know your test results and keep a list of the medicines you take. When should you call for help? Call 911 anytime you think you may need emergency care. For example, call if:    · You passed out (lost consciousness).     · You are short of breath. Call your doctor now or seek immediate medical care if:    · You have pain that does not get better after you take pain medicine.     · You cannot pass stool or gas.     · You are sick to your stomach and cannot drink fluids.     · You have loose stitches, or your incision comes open.     · You have signs of a blood clot, such as:  ? Pain in your calf, back of the knee, thigh, or groin. ? Redness and swelling in your leg or groin.     · You have signs of infection, such as:  ? Increased pain, swelling, warmth, or redness. ? Red streaks leading from the incision. ? Pus draining from the incision. ?  A fever. Watch closely for changes in your health, and be sure to contact your doctor if you have any problems. Where can you learn more? Go to http://www.gray.com/  Enter L324 in the search box to learn more about \"Gastric Sleeve Surgery: What to Expect at Home. \"  Current as of: September 23, 2020               Content Version: 12.8  © 2006-2021 [employer]. Care instructions adapted under license by [employer] (which disclaims liability or warranty for this information). If you have questions about a medical condition or this instruction, always ask your healthcare professional. [place] [address] any warranty or liability for your use of this information.

## 2021-07-22 NOTE — PROGRESS NOTES
Problem: Falls - Risk of  Goal: *Absence of Falls  Description: Document Eleazar Sharma Fall Risk and appropriate interventions in the flowsheet.   Outcome: Resolved/Met  Note: Fall Risk Interventions:            Medication Interventions: Teach patient to arise slowly, Patient to call before getting OOB                   Problem: Patient Education: Go to Patient Education Activity  Goal: Patient/Family Education  Outcome: Resolved/Met     Problem: Patient Education: Go to Patient Education Activity  Goal: Patient/Family Education  Outcome: Resolved/Met

## 2021-07-22 NOTE — PROGRESS NOTES
Care Management Interventions  Mode of Transport at Discharge: Self  Transition of Care Consult (CM Consult): Discharge Planning  Physical Therapy Consult: Yes  Current Support Network: Lives with Spouse  Confirm Follow Up Transport: Self  Discharge Location  Discharge Placement: Home    Sw reviewed chart. Pt is a 41 yr old female adm yesterday due to having a gastric sleeve. Pt is  and lives in [place], [place]. Pt works and has [employer]. Pt is progressing well, ambulating in the halls and should d/c today or tomorrow. No specific discharge needs identified at this time.   Carol Hernandez

## 2021-07-22 NOTE — PROGRESS NOTES
NUTRITION REASSESSMENT    Assessment:  Visited pt on POD # 1 s/p VSG. Pt reports she is doing well s/p sx. She is tolerating CL diet with no n/v. She denies fever/ chills/SOB/ unusual pain. Previous Nutrition Diagnosis: Morbid obesity R/T sedentary lifestyle as evidenced by BMI = 81.28 and 404% IBW. --ongoing, modified--BMI and %IBW adjusted to reflect weight loss--    Intervention:   1. Reminded pt of diet stage upon discharge; full liquids (no solid food)  2. Encouraged protein first focus, reminded pt of 60 g /d protein goal  a. Emphasized importance of using protein shakes to meet protein goals  3. Encouraged pt to sip fluids throughout the day to stay hydrated and reach >64 oz/d goal.  4. Encouraged slow, small sips, reminded pt to sit upright when eating and drinking. 5. Encouraged light exercise; walking, PT exercises  6. Reminded pt to begin MVI supplements  a. Encouraged pt to take supplements one at a time  7. Referred pt to program manual for post-operative trouble shooting tips and additional information. 8. Encouraged pt to call main office with any medical questions or concerns. Monitoring and Evaluation:  1. Monitor for continued safe, supervised weight loss for VSG.     2. F/U per MD Liseth Hanson, RD, LD

## 2021-07-22 NOTE — PROGRESS NOTES
Patient discharged home with her  at this time. A&ox4. No complaints of pain or discomfort. IV taken out and no tele on. Discharge instructions gone over with patient and patient already has prescriptions filled at home. Patient has walked the floor twice this morning. Patient tolerating her fluids well. Urinating fine. Safety precautions maintained. Discharged at this time.

## 2021-07-26 NOTE — DISCHARGE SUMMARY
Patient ID:  Anil Hoffman  [id number]  79 y.o.  1975    Admission Date: 7/21/2021  8:05 AM  Admitting Provider: Mariusz Carlton MD  Discharge Date: 7/22/2021    Admission Diagnoses: Morbid obesity ([facility] [E66.01]  Discharge Diagnoses:    Hospital Problems as of 7/22/2021 Date Reviewed: 4/21/2021        Codes Class Noted - Resolved POA    Morbid obesity ([facility] ICD-10-CM: E66.01  ICD-9-CM: 278.01  3/16/2021 - Present Unknown               Hospital Course:   Patient is a 41 y.o. female who underwent Robotic assisted sleeve gastrectomy on the date of admission by Dr. Mariusz Carlton. There were no complications and the patient tolerated the procedure well. She was admitted to the inpatient hospital service post-operatively for an expected 1-2 overnight hospital stay. Following the procedure, the patient was started on a clear liquid and protein diet, given pain and nausea medication, and maintained on continuous IVF, was encouraged to use incentive spirometry and walk frequently. Daily morning labs were drawn to check hemoglobin levels, electrolytes, and kidney function, and vitals were monitored to assess patient status. The patient was maintained on both GI prophylaxis and VTE prophylaxis, and was evaluated by physical therapy. At the time of discharge, patient was doing well without any significant chest pain, difficulty breathing, nausea or vomiting, ambulating, good urine output, pain well controlled, and able to tolerate adequate oral liquid intake.     Consults: None    Significant Diagnostic Studies: labs:   Lab Results   Component Value Date/Time    WBC 7.6 07/22/2021 06:04 AM    HGB 12.8 07/22/2021 06:04 AM    HCT 39.0 07/22/2021 06:04 AM    PLATELET 428 67/59/0194 06:04 AM    MCV 91.3 07/22/2021 06:04 AM     Lab Results   Component Value Date/Time    Sodium 139 07/22/2021 06:04 AM    Potassium 3.5 07/22/2021 06:04 AM    Chloride 107 07/22/2021 06:04 AM    CO2 29 07/22/2021 06:04 AM    Anion gap 3 (L) 07/22/2021 06:04 AM    Glucose 101 (H) 07/22/2021 06:04 AM    BUN 11 07/22/2021 06:04 AM    Creatinine 0.58 (L) 07/22/2021 06:04 AM    GFR est AA >60 07/22/2021 06:04 AM    GFR est non-AA >60 07/22/2021 06:04 AM    Calcium 7.9 (L) 07/22/2021 06:04 AM     Radiology: None    Disposition: Home    Discharge Medications:   Discharge Medication List as of 7/22/2021  1:38 PM      CONTINUE these medications which have NOT CHANGED    Details   pantoprazole (PROTONIX) 40 mg tablet TAKE 1 TAB BY MOUTH DAILY FOR 42 DAYS AS NEEDED FOR HEARTBURN, Normal, Disp-30 Tablet, R-1      lisinopriL (PRINIVIL, ZESTRIL) 10 mg tablet Take 10 mg by mouth daily. , Historical Med      buPROPion XL (Wellbutrin XL) 150 mg tablet Take 150 mg by mouth every morning., Historical Med      traZODone (DESYREL) 100 mg tablet Take 200 mg by mouth nightly., Historical Med      omeprazole (PRILOSEC) 40 mg capsule Take 1 Capsule by mouth daily. , Normal, Disp-90 Capsule, R-0      ondansetron hcl (ZOFRAN) 8 mg tablet Take 1 Tablet by mouth every eight (8) hours as needed for Nausea or Vomiting., Normal, Disp-30 Tablet, R-0      sucralfate (Carafate) 1 gram tablet Take 1 Tablet by mouth four (4) times daily. , Normal, Disp-56 Tablet, R-0             Follow-up Care/Patient Instructions:   Activity: Activity as tolerated, no driving while on analgesics, no heavy lifting for 4 weeks, and see surgical instructions  Diet: Bariatric full liquid diet  Wound Care: Keep wound clean and dry and as directed    Follow-up Information     Follow up With Specialties Details Why Contact Info    Other, MD Dong    Patient can only remember the practice name and not the physician              Signed:  TANG Singh  7/26/2021  9:35 AM

## 2022-03-18 PROBLEM — E66.01 MORBID OBESITY (HCC): Status: ACTIVE | Noted: 2021-03-16

## 2022-03-18 PROBLEM — I10 HTN (HYPERTENSION): Status: ACTIVE | Noted: 2021-03-16

## 2022-03-18 PROBLEM — F32.A DEPRESSION: Status: ACTIVE | Noted: 2021-03-16

## 2022-03-19 PROBLEM — R06.09 DOE (DYSPNEA ON EXERTION): Status: ACTIVE | Noted: 2021-04-21

## 2022-03-20 PROBLEM — F41.9 ANXIETY: Status: ACTIVE | Noted: 2021-03-16

## 2022-07-24 NOTE — PROGRESS NOTES
Sari Cabello MD   Bariatric & Advanced Laparoscopic Surgery & Endoscopy  [address] [place] [address], [address] [facility]  Surendra Doran  Phone [phone]   Fax [phone]      Date of visit: 2022          Name: Ana Galarza      MRN: [id number]       : 1975       Age: 40 y.o. Sex: female        PCP: No primary care provider on file. CC:    Chief Complaint   Patient presents with    Follow-up     FU - Sleeve 2021         HPI:    1 year post-op visit after a robotic assisted sleeve gastrectomy was done on 2021. She has lost 9 lbs since her last office visit. Weight History Graph    Surgeon: Maxwell Fuller   DOS: 2021   Procedure: Sleeve   Pre-op weight: 453   Ideal body weight: 128   Excess body weight: 325     Post-Surgical Weight Loss  Date: 22  Height: 5' (152.4 cm)  Weight: 348 lb (157.9 kg)  BMI: 67.96  Weight Change: -105 lbs  Total Weight Change: -105 lbs  % EBWL: 32%     Evaluation of Pre-operative Co-morbid Conditions:    Hypertension - Yes, How many medications - 1    Clinical Assessment and Physical Exam:    She is doing well today and is feeling good, but has hit a plateau and gone back to some old haits. She reports that she has been adhering to protein first diet with some difficulty. She denies any abdominal pain, nausea or vomiting. Occasional heartburn with certain foods. She does not report having problems with constipation. She reports walking daily for 30-40 minutes. She admits to having gotten off track with diet and has been drinking soda, which is when she began to hit a stall with her weight loss. Protein:  60+ grams per day  Fluids:    64+ ounces per day  Exercise:  walking daily for 30-40 mins  No fever or chills. Incisions well healed. Rare reflux. Denies dysphagia. Regular bowel movements. Tolerating Protein first diet without difficulty.       PMH:    Past Medical History:   Diagnosis Date    Depression managed with medication     Hypertension     managed with medication     Morbid obesity ([address]     Thyroid disease     during preg       PSH:    Past Surgical History:   Procedure Laterality Date    ADENOIDECTOMY  2003     SECTION  ,     CHOLECYSTECTOMY  2010    GASTRECTOMY  2021    Sleeve gastrectomy    TONSILLECTOMY      TUBAL LIGATION         MEDS:    Current Outpatient Medications   Medication Sig Dispense Refill    buPROPion (WELLBUTRIN XL) 150 MG extended release tablet Take 150 mg by mouth      clotrimazole-betamethasone (LOTRISONE) 1-0.05 % cream Apply topically 2 times daily      lisinopril (PRINIVIL;ZESTRIL) 10 MG tablet Take 10 mg by mouth daily      omeprazole (PRILOSEC) 40 MG delayed release capsule Take 40 mg by mouth daily      ondansetron (ZOFRAN) 8 MG tablet Take 8 mg by mouth every 8 hours as needed      pantoprazole (PROTONIX) 40 MG tablet TAKE 1 TAB BY MOUTH DAILY FOR 42 DAYS AS NEEDED FOR HEARTBURN      sucralfate (CARAFATE) 1 GM tablet Take 1 g by mouth 4 times daily      traZODone (DESYREL) 100 MG tablet Take 200 mg by mouth       No current facility-administered medications for this visit. ALLERGIES:      Not on File    SH:    Social History     Tobacco Use    Smoking status: Former    Smokeless tobacco: Never    Tobacco comments:     Quit smoking: Social smoker in the past in early    Substance Use Topics    Alcohol use: Not Currently    Drug use: Never       FH:    Family History   Problem Relation Age of Onset    COPD Father     No Known Problems Mother     Diabetes Father     Hypertension Father        Review of systems:  The patient has no difficulty with chest pain or shortness of breath. No fever or chills. Comprehensive 13 point review of systems was otherwise unremarkable except as noted above.      Physical Exam:     BP (!) 140/84   Pulse 84   Ht 5' (1.524 m)   Wt (!) 348 lb (157.9 kg)   BMI 67.96 kg/m²     General:  Well-developed, well-nourished, no distress. Psych:  Cooperative, good insight and judgement. Neuro:  Alert, oriented to person, place and time. HEENT:  Normocephalic, atraumatic. Sclera clear. Lungs:  Unlabored breathing. Symmetrical chest expansion. Chest wall:  No tenderness or deformity. Heart:  Regular rate and rhythm. No JVD. Abdomen:  Soft, non-tender, non-distended. No guarding or rebound. Well healed lap incisions. Extremities:  Extremities normal, atraumatic, no cyanosis or edema. Skin:  Skin color, texture, turgor normal. No rashes. Labs: All recent labs were reviewed. Imaging:         Diagnosis Orders   1. Morbid obesity ([address]        2. Body mass index (BMI) 60.0-69.9, adult ([facility]        3. S/P laparoscopic sleeve gastrectomy        4. Dietary counseling        5. Exercise counseling        6. Noncompliance with required dietary regimen following bariatric surgery            Assessment/Plan:  Nelia Nunn is a 40 y.o. female here to follow up s/p robotic assisted sleeve gastrectomy. She is doing well. Weight loss is slow and we may consider medications if not improved after diet reset. She is somewhat adhering to the diet and we discussed and addressed all her questions. I encouraged her to increase physical activity and aim for 300 minutes a week and include 2 strength session a week to maintain the weight loss. She will continue the recommended vitamin/mineral supplementation as directed. RTC in 6 months        Time: I spent 40 minutes preparing to see patient (including chart review and preparation), obtaining and/or reviewing additional medical history, performing a physical exam and evaluation, documenting clinical information in the electronic health record, independently interpreting results, communicating results to patient, family or caregiver, and/or coordinating care.        Signed: Mejia Deutsch MD  Bariatric & Minimally Invasive Surgery  7/28/2022

## 2022-07-28 ENCOUNTER — OFFICE VISIT (OUTPATIENT)
Dept: SURGERY | Age: 47
End: 2022-07-28
Payer: COMMERCIAL

## 2022-07-28 VITALS
BODY MASS INDEX: 57.52 KG/M2 | HEART RATE: 84 BPM | WEIGHT: 293 LBS | SYSTOLIC BLOOD PRESSURE: 140 MMHG | DIASTOLIC BLOOD PRESSURE: 84 MMHG | HEIGHT: 60 IN

## 2022-07-28 DIAGNOSIS — E66.01 MORBID OBESITY (HCC): Primary | ICD-10-CM

## 2022-07-28 DIAGNOSIS — Z71.82 EXERCISE COUNSELING: ICD-10-CM

## 2022-07-28 DIAGNOSIS — Z91.119 NONCOMPLIANCE WITH REQUIRED DIETARY REGIMEN FOLLOWING BARIATRIC SURGERY: ICD-10-CM

## 2022-07-28 DIAGNOSIS — Z98.84 S/P LAPAROSCOPIC SLEEVE GASTRECTOMY: ICD-10-CM

## 2022-07-28 DIAGNOSIS — Z71.3 DIETARY COUNSELING: ICD-10-CM

## 2022-07-28 DIAGNOSIS — Z98.84 NONCOMPLIANCE WITH REQUIRED DIETARY REGIMEN FOLLOWING BARIATRIC SURGERY: ICD-10-CM

## 2022-07-28 PROCEDURE — APPSS30 APP SPLIT SHARED TIME 16-30 MINUTES: Performed by: PHYSICIAN ASSISTANT

## 2022-07-28 PROCEDURE — 99215 OFFICE O/P EST HI 40 MIN: CPT | Performed by: SURGERY

## 2022-07-28 NOTE — PROGRESS NOTES
Roxy Rios, MS, RD, LD  Surgical Weight Loss Dietitian  [address] [place] [address], [address] [place] [address]  Surendra Doran  Phone [phone]   Fax [phone]    [facility] NUTRITION REASSESSMENT  Anthropometrics:    Ht: 50, wt: 348#, 256% IBW, 67.96 BMI  Pt has lost 9 lbs since last visit. Pt is 1 year post-op VSG. Pt getting ~60g protein/d and ~64+oz fluid/d. Pt is eating at least 3x/d. Pt is waiting 10-15 minutes after eating to drink liquids. She is drinking diet sodas 6 oz daily, water, protein shake. Pt is exercising 3-4 d/week for 30-40 mins via walking. Pt is taking MVI and Ca supplements as recommended. No food-related concerns at this time. Pt reports following non-scale successes: have reduced clothes sizes. Pt reports she has hit a weight plateau. Diet Recall:   Breakfast: protein shake   Lunch: 1/2 chicken wrap with cheese   Dinner: 3 sliders, small portion mashed potatoes   Drinks: protein shake, water       Nutrition Diagnosis:  Class III obesity R/T excessive energy intake as evidenced by BMI = 67.96 and 256 % IBW.  --ongoing, modified--BMI and %IBW adjusted to reflect weight loss--    Undesirable food choices R/T food choices consistent with recommendations as evidenced by s/p VSG, consuming carbonated drinks, and experiencing increased reflux. Intervention:   Evaluated diet recall and identified modifications. Encouraged lean protein focus  Encouraged incorporation of non-starchy vegetables  Encouraged practice with meal priority; protein first followed by non-starchy vegetables and complex carbohydrates  Encouraged increased fluid intake  Encouraged avoidance of carbonated beverages  Discussed meal/food ideas on regular bariatric diet. Encouraged continued and increased activity. Encouraged incorporation of cardio/resistance training. Pt goal of continuing current exercise routine and increasing as tolerated.     Monitoring and Evaluation:  Monitor for continued safe, supervised

## 2023-01-20 NOTE — PROGRESS NOTES
Edin Hilton MD   Bariatric & Advanced Laparoscopic Surgery & Endoscopy  [address] [place] [id number], [address] [facility]  Surendra Hankins  Phone [phone]   Fax [phone]      Date of visit: 2023          Name: Elayne Roth      MRN: [id number]       : 1975       Age: 56 y.o. Sex: female        PCP: None None     CC:    Chief Complaint   Patient presents with    Follow-up     Sleeve 21       HPI:    1.5 years post-op visit after a robotic assisted sleeve gastrectomy was done on 2021. She has lost 10 lbs since her last office visit. Weight History Graph    Surgeon: Leeann Young   DOS: 2021   Procedure: Sleeve   Pre-op weight: 265   Ideal body weight: 128   Excess body weight: 325      Post-Surgical Weight Loss  Date: 23  Height: 5' (152.4 cm)  Weight: 338 lb (153.3 kg)  BMI: 66.00  Weight Change: -10 lbs  Total Weight Change: -115 lbs  % EBWL: 35%     Evaluation of Pre-operative Co-morbid Conditions:    Hypertension - Yes, How many medications - 1    Clinical Assessment and Physical Exam:    She is doing very well today and is feeling good. She reports that she has been adhering to protein first diet without difficulty. She denies any abdominal pain, nausea or vomiting or heartburn. She does not report having problems with constipation. She reports walking and home workout videos 3x per 30-40 minutes. Protein:  60 grams per day  Fluids:    64 ounces per day  Exercise:  walking, home workouts 3x per week for 30-40 minutes  No fever or chills. Incisions well healed. Denies reflux. Denies dysphagia. Regular bowel movements. Tolerating Protein first diet without difficulty.       PMH:    Past Medical History:   Diagnosis Date    Depression     managed with medication     Hypertension     managed with medication     Morbid obesity ([address]     Thyroid disease     during preg       PSH:    Past Surgical History:   Procedure Laterality Date ADENOIDECTOMY  2003     SECTION  ,     CHOLECYSTECTOMY  2010    GASTRECTOMY  2021    Sleeve gastrectomy    TONSILLECTOMY      TUBAL LIGATION         MEDS:    Current Outpatient Medications   Medication Sig Dispense Refill    buPROPion (WELLBUTRIN XL) 150 MG extended release tablet Take 150 mg by mouth      clotrimazole-betamethasone (LOTRISONE) 1-0.05 % cream Apply topically 2 times daily      lisinopril (PRINIVIL;ZESTRIL) 10 MG tablet Take 10 mg by mouth daily      omeprazole (PRILOSEC) 40 MG delayed release capsule Take 40 mg by mouth daily      ondansetron (ZOFRAN) 8 MG tablet Take 8 mg by mouth every 8 hours as needed      pantoprazole (PROTONIX) 40 MG tablet TAKE 1 TAB BY MOUTH DAILY FOR 42 DAYS AS NEEDED FOR HEARTBURN      sucralfate (CARAFATE) 1 GM tablet Take 1 g by mouth 4 times daily      traZODone (DESYREL) 100 MG tablet Take 200 mg by mouth       No current facility-administered medications for this visit. ALLERGIES:      No Known Allergies    SH:    Social History     Tobacco Use    Smoking status: Former    Smokeless tobacco: Never    Tobacco comments:     Quit smoking: Social smoker in the past in early    Substance Use Topics    Alcohol use: Not Currently    Drug use: Never       FH:    Family History   Problem Relation Age of Onset    COPD Father     No Known Problems Mother     Diabetes Father     Hypertension Father        Review of systems:  The patient has no difficulty with chest pain or shortness of breath. No fever or chills. Comprehensive 13 point review of systems was otherwise unremarkable except as noted above. Physical Exam:     BP (!) 160/84   Pulse 100   Ht 5' (1.524 m)   Wt (!) 338 lb (153.3 kg)   BMI 66.01 kg/m²     General:  Well-developed, well-nourished, no distress. Psych:  Cooperative, good insight and judgement. Neuro:  Alert, oriented to person, place and time. HEENT:  Normocephalic, atraumatic. Sclera clear.   Lungs: Unlabored breathing. Symmetrical chest expansion. Chest wall:  No tenderness or deformity. Heart:  Regular rate and rhythm. No JVD. Abdomen:  Soft, non-tender, non-distended. No guarding or rebound. Well healed lap incisions. Extremities:  Extremities normal, atraumatic, no cyanosis or edema. Skin:  Skin color, texture, turgor normal. No rashes. Labs: All recent labs were reviewed. Imaging: All recent imaging was reviewed. Diagnosis Orders   1. Morbid obesity ([address]        2. Obesity, morbid, BMI 50 or higher ([address]        3. S/P laparoscopic sleeve gastrectomy        4. Dietary counseling        5. Exercise counseling            Assessment/Plan:  Maria Teresa Blue is a 56 y.o. female here to follow up s/p robotic assisted sleeve gastrectomy. She is doing well without any major concerns. She is adhering to the diet and we discussed and addressed all her questions. I encouraged her to continue physical activity and aim for 300 minutes a week and include 2 strength session a week to maintain the weight loss. She will continue the recommended vitamin/mineral supplementation as directed. RTC in 1 years       Time: I spent 40 minutes preparing to see patient (including chart review and preparation), obtaining and/or reviewing additional medical history, performing a physical exam and evaluation, documenting clinical information in the electronic health record, independently interpreting results, communicating results to patient, family or caregiver, and/or coordinating care.        Signed: Raisa Schmitt MD  Bariatric & Minimally Invasive Surgery  1/26/2023

## 2023-01-26 ENCOUNTER — OFFICE VISIT (OUTPATIENT)
Dept: SURGERY | Age: 48
End: 2023-01-26
Payer: COMMERCIAL

## 2023-01-26 VITALS
HEART RATE: 100 BPM | SYSTOLIC BLOOD PRESSURE: 160 MMHG | WEIGHT: 293 LBS | DIASTOLIC BLOOD PRESSURE: 84 MMHG | BODY MASS INDEX: 57.52 KG/M2 | HEIGHT: 60 IN

## 2023-01-26 DIAGNOSIS — E66.01 OBESITY, MORBID, BMI 50 OR HIGHER (HCC): ICD-10-CM

## 2023-01-26 DIAGNOSIS — E66.01 MORBID OBESITY (HCC): Primary | ICD-10-CM

## 2023-01-26 DIAGNOSIS — Z98.84 S/P LAPAROSCOPIC SLEEVE GASTRECTOMY: ICD-10-CM

## 2023-01-26 DIAGNOSIS — Z71.82 EXERCISE COUNSELING: ICD-10-CM

## 2023-01-26 DIAGNOSIS — Z71.3 DIETARY COUNSELING: ICD-10-CM

## 2023-01-26 PROCEDURE — 3077F SYST BP >= 140 MM HG: CPT | Performed by: SURGERY

## 2023-01-26 PROCEDURE — 3079F DIAST BP 80-89 MM HG: CPT | Performed by: SURGERY

## 2023-01-26 PROCEDURE — 99215 OFFICE O/P EST HI 40 MIN: CPT | Performed by: SURGERY

## 2023-01-26 PROCEDURE — APPSS30 APP SPLIT SHARED TIME 16-30 MINUTES: Performed by: PHYSICIAN ASSISTANT

## 2023-01-26 NOTE — PROGRESS NOTES
Ilana Zuniga MS, RD, LD  Surgical Weight Loss Dietitian  [address], [address]  [place] [address]  Phone [phone]   Fax [phone]    [facility] NUTRITION REASSESSMENT  Anthropometrics:    Ht: 5’0”, wt: 338#, 249% IBW, 66.01 BMI  Pt has lost 10 lbs since last visit.    Pt is 1.5 years post-op VSG. Pt getting ~60g protein/d and ~64oz fluid/d.  Pt is eating at least 3x/d.  Pt is waiting 30 minutes after eating to drink liquids.  She is drinking water.  Pt is exercising 3-4d/w for 30-40 mins via walking and workout videos.  Pt is taking MVI and Ca supplements as recommended.  No food-related concerns at this time. Pt reports she is still interested in OM program.    Diet Recall:   Breakfast: egg, cheese   Lunch: pepperoni, hummus, crackers, peppers, pickles   Dinner: ham, turkey, lettuce, tomato   Drinks: water       Nutrition Diagnosis:  Class III obesity R/T excessive energy intake as evidenced by BMI = 66.01 and 249 % IBW.  --ongoing, modified--BMI and %IBW adjusted to reflect weight loss--      Intervention:   Evaluated diet recall and identified modifications.  Encouraged lean protein focus  Encouraged incorporation of non-starchy vegetables  Encouraged practice with meal priority; protein first followed by non-starchy vegetables and complex carbohydrates  Encouraged increased fluid intake  Discussed meal/food ideas on regular bariatric diet.   Encouraged continued and increased activity as tolerated.  Encouraged incorporation of cardio/resistance training.    Monitoring and Evaluation:  Monitor for continued safe, supervised weight loss for VSG.   Follow with OM as available.  Follow for increased activity.   F/U per MD Ilana Zuniga MS, RD, LD

## 2023-08-09 ENCOUNTER — OFFICE VISIT (OUTPATIENT)
Dept: SURGERY | Age: 48
End: 2023-08-09
Payer: COMMERCIAL

## 2023-08-09 VITALS
BODY MASS INDEX: 57.52 KG/M2 | WEIGHT: 293 LBS | HEIGHT: 60 IN | SYSTOLIC BLOOD PRESSURE: 124 MMHG | DIASTOLIC BLOOD PRESSURE: 86 MMHG | HEART RATE: 72 BPM

## 2023-08-09 DIAGNOSIS — Z71.82 EXERCISE COUNSELING: ICD-10-CM

## 2023-08-09 DIAGNOSIS — E66.01 MORBID OBESITY (HCC): Primary | ICD-10-CM

## 2023-08-09 DIAGNOSIS — Z98.84 S/P LAPAROSCOPIC SLEEVE GASTRECTOMY: ICD-10-CM

## 2023-08-09 DIAGNOSIS — K59.1 FUNCTIONAL DIARRHEA: ICD-10-CM

## 2023-08-09 DIAGNOSIS — Z71.3 DIETARY COUNSELING: ICD-10-CM

## 2023-08-09 PROCEDURE — 3079F DIAST BP 80-89 MM HG: CPT | Performed by: SURGERY

## 2023-08-09 PROCEDURE — 99215 OFFICE O/P EST HI 40 MIN: CPT | Performed by: SURGERY

## 2023-08-09 PROCEDURE — 3074F SYST BP LT 130 MM HG: CPT | Performed by: SURGERY

## 2023-08-09 PROCEDURE — APPSS30 APP SPLIT SHARED TIME 16-30 MINUTES: Performed by: PHYSICIAN ASSISTANT

## 2024-08-12 NOTE — PROGRESS NOTES
Suad Carrington MD   Bariatric & Advanced Laparoscopic Surgery & Endoscopy  [address], [address]  [place] [address]  Phone [phone]   Fax [phone]      Date of visit: 2024          Name: Luna Klein      MRN: [id number]       : 1975       Age: 48 y.o.    Sex: female        PCP: None, None     CC:    Chief Complaint   Patient presents with    Follow-up     SLEEVE 2021         HPI:    3 years post-op visit after a laparoscopic sleeve gastrectomy was done on 2021.   She has lost 41 lbs since her last office visit.    Weight History Graph    Surgeon: César   DOS: 2021   Procedure: Sleeve   Pre-op weight: 453   Ideal body weight: 128   Excess body weight: 325      Last Surgical Weight Loss:      2022     8:55 PM 2022     9:44 AM 2023     9:33 AM 2023     9:47 AM 2024     8:27 AM   Surgical Weight Loss Tracker   Ideal Body Weight 128 lb       Surgery Date 2021       Pre-Surgical Height 5' 0\"       Pre-Surgical Weight 453 lb       Pre Surgery BMI 88.46       Weight to Lose 325 lb       Date  2022   Height  5' 0\" 5' 0\" 5' 0\" 5' 0\"   Weight  348 lb 338 lb 324 lb 283 lb   BMI  67.96 66 63.27 55.26   Weight Change  -105 lb -10 lb -14 lb -41 lb   Total Weight Change  -105 lb -115 lb -129 lb -170 lb   % EBWL  32% 35% 40% 52%        Evaluation of pre-operative co-morbid conditions:    Hypertension RS     Clinical Assessment and Physical Exam:    She is doing very well today and is feeling good.  She reports that she has been adhering to protein first diet without difficulty.  She denies any abdominal pain, nausea or vomiting or heartburn. She does report having problems with constipation and diarrhea.  She reports walking daily but is limited with her ankle pain.      Protein:  60 grams per day  Fluids:    64 ounces per day  Exercise:  walking daily - limited by ankle pain  No fever

## 2024-08-14 ENCOUNTER — OFFICE VISIT (OUTPATIENT)
Dept: SURGERY | Age: 49
End: 2024-08-14
Payer: COMMERCIAL

## 2024-08-14 VITALS
HEIGHT: 60 IN | DIASTOLIC BLOOD PRESSURE: 74 MMHG | SYSTOLIC BLOOD PRESSURE: 139 MMHG | BODY MASS INDEX: 55.56 KG/M2 | WEIGHT: 283 LBS | HEART RATE: 100 BPM

## 2024-08-14 DIAGNOSIS — Z71.82 EXERCISE COUNSELING: ICD-10-CM

## 2024-08-14 DIAGNOSIS — E66.01 OBESITY, MORBID, BMI 50 OR HIGHER (HCC): ICD-10-CM

## 2024-08-14 DIAGNOSIS — K59.1 FUNCTIONAL DIARRHEA: ICD-10-CM

## 2024-08-14 DIAGNOSIS — E66.01 MORBID OBESITY (HCC): Primary | ICD-10-CM

## 2024-08-14 DIAGNOSIS — Z98.84 S/P LAPAROSCOPIC SLEEVE GASTRECTOMY: ICD-10-CM

## 2024-08-14 DIAGNOSIS — K59.01 SLOW TRANSIT CONSTIPATION: ICD-10-CM

## 2024-08-14 DIAGNOSIS — Z71.3 DIETARY COUNSELING: ICD-10-CM

## 2024-08-14 PROBLEM — E05.00 GRAVES' DISEASE: Status: ACTIVE | Noted: 2024-08-14

## 2024-08-14 PROBLEM — I10 HYPERTENSION: Status: ACTIVE | Noted: 2021-03-16

## 2024-08-14 PROCEDURE — 3075F SYST BP GE 130 - 139MM HG: CPT | Performed by: SURGERY

## 2024-08-14 PROCEDURE — APPSS30 APP SPLIT SHARED TIME 16-30 MINUTES: Performed by: PHYSICIAN ASSISTANT

## 2024-08-14 PROCEDURE — 3078F DIAST BP <80 MM HG: CPT | Performed by: SURGERY

## 2024-08-14 PROCEDURE — 99215 OFFICE O/P EST HI 40 MIN: CPT | Performed by: SURGERY

## 2024-08-14 RX ORDER — DICLOFENAC SODIUM 75 MG/1
TABLET, DELAYED RELEASE ORAL
COMMUNITY
Start: 2024-08-13

## 2024-08-14 RX ORDER — LORAZEPAM 0.5 MG/1
TABLET ORAL
COMMUNITY
Start: 2024-07-18

## 2024-08-14 RX ORDER — ZOLPIDEM TARTRATE 5 MG/1
TABLET ORAL
COMMUNITY

## 2024-08-14 NOTE — PROGRESS NOTES
[facility] NUTRITION REASSESSMENT  Assessment:   3 years post-op VSG. Pt consuming ~60g protein/d and ~64oz fluid/d. Pt is eating at least 3x/d. Pt is drinking water. Pt is exercising via walking daily. Pt is taking MVI and Ca supplements as recommended.    Diet Recall:   Breakfast: Eggs and fruit   Lunch: Hummus and panfilo with hamburger casserole    Dinner: Yasmin glaser      Intervention:   Evaluated diet recalls.  Encouraged continued and increased activity intensity        Monitoring and Evaluation:  Monitor for continued safe, supervised weight loss for VSG.   F/U per MD Shadia Modi MBA. RD, LD

## 2025-08-14 ENCOUNTER — OFFICE VISIT (OUTPATIENT)
Dept: SURGERY | Age: 50
End: 2025-08-14
Payer: COMMERCIAL

## 2025-08-14 VITALS
BODY MASS INDEX: 52.22 KG/M2 | HEART RATE: 72 BPM | SYSTOLIC BLOOD PRESSURE: 123 MMHG | DIASTOLIC BLOOD PRESSURE: 81 MMHG | WEIGHT: 266 LBS | HEIGHT: 60 IN

## 2025-08-14 DIAGNOSIS — E66.01 OBESITY, MORBID, BMI 50 OR HIGHER (HCC): ICD-10-CM

## 2025-08-14 DIAGNOSIS — Z71.3 DIETARY COUNSELING: ICD-10-CM

## 2025-08-14 DIAGNOSIS — Z71.82 EXERCISE COUNSELING: ICD-10-CM

## 2025-08-14 DIAGNOSIS — Z98.84 S/P LAPAROSCOPIC SLEEVE GASTRECTOMY: Primary | ICD-10-CM

## 2025-08-14 PROCEDURE — 99215 OFFICE O/P EST HI 40 MIN: CPT | Performed by: SURGERY

## 2025-08-14 PROCEDURE — 3079F DIAST BP 80-89 MM HG: CPT | Performed by: SURGERY

## 2025-08-14 PROCEDURE — 3074F SYST BP LT 130 MM HG: CPT | Performed by: SURGERY

## (undated) DEVICE — CADIERE FORCEPS: Brand: ENDOWRIST;DAVINCI SI

## (undated) DEVICE — SEAL

## (undated) DEVICE — ELECTRO LUBE IS A SINGLE PATIENT USE DEVICE THAT IS INTENDED TO BE USED ON ELECTROSURGICAL ELECTRODES TO REDUCE STICKING.: Brand: KEY SURGICAL ELECTRO LUBE

## (undated) DEVICE — SYR 10ML LUER LOK 1/5ML GRAD --

## (undated) DEVICE — KIT,ANTI FOG,W/SPONGE & FLUID,SOFT PACK: Brand: MEDLINE

## (undated) DEVICE — SOLUTION IRRIG 3000ML 0.9% SOD CHL FLX CONT 0797208] ICU MEDICAL INC]

## (undated) DEVICE — STAPLER 60 RELOAD WHITE: Brand: SUREFORM

## (undated) DEVICE — STAPLER 60 RELOAD GREEN: Brand: SUREFORM

## (undated) DEVICE — AIRSEAL BIFURCATED SMOKE EVAC FILTERED TUBE SET: Brand: AIRSEAL

## (undated) DEVICE — KENDALL RADIOLUCENT FOAM MONITORING ELECTRODE RECTANGULAR SHAPE: Brand: KENDALL

## (undated) DEVICE — GARMENT,MEDLINE,DVT,INT,CALF,XL,GEN2: Brand: MEDLINE

## (undated) DEVICE — 2, DISPOSABLE SUCTION/IRRIGATOR WITHOUT DISPOSABLE TIP: Brand: STRYKEFLOW

## (undated) DEVICE — SUTURE SZ 0 27IN 5/8 CIR UR-6  TAPER PT VIOLET ABSRB VICRYL J603H

## (undated) DEVICE — COVER,TABLE,44X76,STERILE: Brand: MEDLINE

## (undated) DEVICE — INSUFFLATION NEEDLE TO ESTABLISH PNEUMOPERITONEUM.: Brand: INSUFFLATION NEEDLE

## (undated) DEVICE — 40585 XL ADVANCED TRENDELENBURG POSITIONING KIT: Brand: 40585 XL ADVANCED TRENDELENBURG POSITIONING KIT

## (undated) DEVICE — (D)PREP SKN CHLRAPRP APPL 26ML -- CONVERT TO ITEM 371833

## (undated) DEVICE — GOWN,REINF,POLY,ECL,PP SLV,XL: Brand: MEDLINE

## (undated) DEVICE — CONNECTOR TBNG OD5-7MM O2 END DISP

## (undated) DEVICE — VISUALIZATION SYSTEM: Brand: CLEARIFY

## (undated) DEVICE — REM POLYHESIVE ADULT PATIENT RETURN ELECTRODE: Brand: VALLEYLAB

## (undated) DEVICE — CARDINAL HEALTH FLEXIBLE LIGHT HANDLE COVER: Brand: CARDINAL HEALTH

## (undated) DEVICE — CONTAINER PREFIL FRMLN 40ML --

## (undated) DEVICE — STAPLER 60: Brand: SUREFORM

## (undated) DEVICE — BLOCK BITE AD 60FR W/ VELC STRP ADDRESSES MOST PT AND

## (undated) DEVICE — SUTURE VCRL SZ 3-0 L18IN ABSRB VLT L26MM SH 1/2 CIR J774D

## (undated) DEVICE — NEEDLE HYPO 21GA L1.5IN INTRAMUSCULAR S STL LATCH BVL UP

## (undated) DEVICE — BLADELESS OBTURATOR: Brand: WECK VISTA

## (undated) DEVICE — VESSEL SEALER XI EXTENDED DISP -- VESSEL

## (undated) DEVICE — REDUCER CANN ENDOWRIST 12-8MM -- DA VINCI XI - SNGL USE

## (undated) DEVICE — COLUMN DRAPE

## (undated) DEVICE — CANNULA NSL ORAL AD FOR CAPNOFLEX CO2 O2 AIRLFE

## (undated) DEVICE — SPONGE LAP 18X18IN STRL -- 5/PK

## (undated) DEVICE — STAPLER RELOAD SUREFORM 60 BLU -- DA VINCI XI

## (undated) DEVICE — 2000CC GUARDIAN II: Brand: GUARDIAN

## (undated) DEVICE — DERMABOND SKIN ADH 0.7ML -- DERMABOND ADVANCED 12/BX

## (undated) DEVICE — SINGLE BASIN: Brand: CARDINAL HEALTH

## (undated) DEVICE — ARM DRAPE

## (undated) DEVICE — FORCEPS BX L240CM JAW DIA2.8MM L CAP W/ NDL MIC MESH TOOTH